# Patient Record
Sex: MALE | Race: BLACK OR AFRICAN AMERICAN | NOT HISPANIC OR LATINO | Employment: STUDENT | URBAN - METROPOLITAN AREA
[De-identification: names, ages, dates, MRNs, and addresses within clinical notes are randomized per-mention and may not be internally consistent; named-entity substitution may affect disease eponyms.]

---

## 2023-05-14 ENCOUNTER — HOSPITAL ENCOUNTER (EMERGENCY)
Facility: CLINIC | Age: 23
Discharge: HOME | End: 2023-05-14
Attending: EMERGENCY MEDICINE | Admitting: EMERGENCY MEDICINE
Payer: COMMERCIAL

## 2023-05-14 ENCOUNTER — APPOINTMENT (OUTPATIENT)
Dept: GENERAL RADIOLOGY | Facility: CLINIC | Age: 23
End: 2023-05-14
Attending: EMERGENCY MEDICINE
Payer: COMMERCIAL

## 2023-05-14 VITALS
TEMPERATURE: 98.3 F | RESPIRATION RATE: 18 BRPM | BODY MASS INDEX: 31.5 KG/M2 | SYSTOLIC BLOOD PRESSURE: 119 MMHG | WEIGHT: 220 LBS | HEART RATE: 85 BPM | HEIGHT: 70 IN | DIASTOLIC BLOOD PRESSURE: 80 MMHG

## 2023-05-14 DIAGNOSIS — K21.00 GASTROESOPHAGEAL REFLUX DISEASE WITH ESOPHAGITIS WITHOUT HEMORRHAGE: ICD-10-CM

## 2023-05-14 DIAGNOSIS — J20.9 ACUTE BRONCHITIS, UNSPECIFIED ORGANISM: ICD-10-CM

## 2023-05-14 DIAGNOSIS — Z11.52 ENCOUNTER FOR SCREENING LABORATORY TESTING FOR SEVERE ACUTE RESPIRATORY SYNDROME CORONAVIRUS 2 (SARS-COV-2): ICD-10-CM

## 2023-05-14 LAB
ALBUMIN SERPL BCG-MCNC: 4.5 G/DL (ref 3.5–5.2)
ALP SERPL-CCNC: 89 U/L (ref 40–129)
ALT SERPL W P-5'-P-CCNC: 80 U/L (ref 10–50)
ANION GAP SERPL CALCULATED.3IONS-SCNC: 13 MMOL/L (ref 7–15)
AST SERPL W P-5'-P-CCNC: 33 U/L (ref 10–50)
ATRIAL RATE - MUSE: 98 BPM
BASOPHILS # BLD AUTO: 0 10E3/UL (ref 0–0.2)
BASOPHILS NFR BLD AUTO: 1 %
BILIRUB SERPL-MCNC: 0.5 MG/DL
BUN SERPL-MCNC: 13 MG/DL (ref 6–20)
CALCIUM SERPL-MCNC: 9.6 MG/DL (ref 8.6–10)
CHLORIDE SERPL-SCNC: 106 MMOL/L (ref 98–107)
CREAT SERPL-MCNC: 0.92 MG/DL (ref 0.67–1.17)
DEPRECATED HCO3 PLAS-SCNC: 23 MMOL/L (ref 22–29)
DIASTOLIC BLOOD PRESSURE - MUSE: NORMAL MMHG
EOSINOPHIL # BLD AUTO: 0.1 10E3/UL (ref 0–0.7)
EOSINOPHIL NFR BLD AUTO: 2 %
ERYTHROCYTE [DISTWIDTH] IN BLOOD BY AUTOMATED COUNT: 12.4 % (ref 10–15)
FLUAV RNA SPEC QL NAA+PROBE: NEGATIVE
FLUBV RNA RESP QL NAA+PROBE: NEGATIVE
GFR SERPL CREATININE-BSD FRML MDRD: >90 ML/MIN/1.73M2
GLUCOSE SERPL-MCNC: 134 MG/DL (ref 70–99)
HCT VFR BLD AUTO: 43.3 % (ref 40–53)
HGB BLD-MCNC: 14.5 G/DL (ref 13.3–17.7)
HOLD SPECIMEN: NORMAL
HOLD SPECIMEN: NORMAL
IMM GRANULOCYTES # BLD: 0 10E3/UL
IMM GRANULOCYTES NFR BLD: 0 %
INTERPRETATION ECG - MUSE: NORMAL
LIPASE SERPL-CCNC: 17 U/L (ref 13–60)
LYMPHOCYTES # BLD AUTO: 1.8 10E3/UL (ref 0.8–5.3)
LYMPHOCYTES NFR BLD AUTO: 24 %
MCH RBC QN AUTO: 28.6 PG (ref 26.5–33)
MCHC RBC AUTO-ENTMCNC: 33.5 G/DL (ref 31.5–36.5)
MCV RBC AUTO: 85 FL (ref 78–100)
MONOCYTES # BLD AUTO: 0.6 10E3/UL (ref 0–1.3)
MONOCYTES NFR BLD AUTO: 8 %
NEUTROPHILS # BLD AUTO: 5 10E3/UL (ref 1.6–8.3)
NEUTROPHILS NFR BLD AUTO: 65 %
NRBC # BLD AUTO: 0 10E3/UL
NRBC BLD AUTO-RTO: 0 /100
P AXIS - MUSE: 46 DEGREES
PLATELET # BLD AUTO: 316 10E3/UL (ref 150–450)
POTASSIUM SERPL-SCNC: 3.7 MMOL/L (ref 3.4–5.3)
PR INTERVAL - MUSE: 124 MS
PROT SERPL-MCNC: 7.8 G/DL (ref 6.4–8.3)
QRS DURATION - MUSE: 78 MS
QT - MUSE: 332 MS
QTC - MUSE: 423 MS
R AXIS - MUSE: 22 DEGREES
RBC # BLD AUTO: 5.07 10E6/UL (ref 4.4–5.9)
RSV RNA SPEC NAA+PROBE: NEGATIVE
SARS-COV-2 RNA RESP QL NAA+PROBE: NEGATIVE
SODIUM SERPL-SCNC: 142 MMOL/L (ref 136–145)
SYSTOLIC BLOOD PRESSURE - MUSE: NORMAL MMHG
T AXIS - MUSE: 4 DEGREES
TROPONIN T SERPL HS-MCNC: 7 NG/L
VENTRICULAR RATE- MUSE: 98 BPM
WBC # BLD AUTO: 7.6 10E3/UL (ref 4–11)

## 2023-05-14 PROCEDURE — 250N000011 HC RX IP 250 OP 636: Performed by: EMERGENCY MEDICINE

## 2023-05-14 PROCEDURE — 84484 ASSAY OF TROPONIN QUANT: CPT | Performed by: EMERGENCY MEDICINE

## 2023-05-14 PROCEDURE — 36415 COLL VENOUS BLD VENIPUNCTURE: CPT | Performed by: EMERGENCY MEDICINE

## 2023-05-14 PROCEDURE — 87637 SARSCOV2&INF A&B&RSV AMP PRB: CPT | Performed by: EMERGENCY MEDICINE

## 2023-05-14 PROCEDURE — 85025 COMPLETE CBC W/AUTO DIFF WBC: CPT | Performed by: EMERGENCY MEDICINE

## 2023-05-14 PROCEDURE — 83690 ASSAY OF LIPASE: CPT | Performed by: EMERGENCY MEDICINE

## 2023-05-14 PROCEDURE — 93005 ELECTROCARDIOGRAM TRACING: CPT

## 2023-05-14 PROCEDURE — 99285 EMERGENCY DEPT VISIT HI MDM: CPT | Mod: 25

## 2023-05-14 PROCEDURE — 71046 X-RAY EXAM CHEST 2 VIEWS: CPT

## 2023-05-14 PROCEDURE — 93010 ELECTROCARDIOGRAM REPORT: CPT | Performed by: EMERGENCY MEDICINE

## 2023-05-14 PROCEDURE — 250N000013 HC RX MED GY IP 250 OP 250 PS 637: Performed by: EMERGENCY MEDICINE

## 2023-05-14 PROCEDURE — 80053 COMPREHEN METABOLIC PANEL: CPT | Performed by: EMERGENCY MEDICINE

## 2023-05-14 PROCEDURE — 99284 EMERGENCY DEPT VISIT MOD MDM: CPT | Mod: 25 | Performed by: EMERGENCY MEDICINE

## 2023-05-14 PROCEDURE — C9803 HOPD COVID-19 SPEC COLLECT: HCPCS

## 2023-05-14 PROCEDURE — 71046 X-RAY EXAM CHEST 2 VIEWS: CPT | Mod: 26 | Performed by: RADIOLOGY

## 2023-05-14 RX ORDER — ONDANSETRON 4 MG/1
4 TABLET, ORALLY DISINTEGRATING ORAL EVERY 8 HOURS PRN
Qty: 10 TABLET | Refills: 0 | Status: SHIPPED | OUTPATIENT
Start: 2023-05-14 | End: 2023-05-17

## 2023-05-14 RX ORDER — AZITHROMYCIN 250 MG/1
TABLET, FILM COATED ORAL
Qty: 6 TABLET | Refills: 0 | Status: SHIPPED | OUTPATIENT
Start: 2023-05-14 | End: 2023-05-19

## 2023-05-14 RX ORDER — ONDANSETRON 4 MG/1
4 TABLET, ORALLY DISINTEGRATING ORAL ONCE
Status: COMPLETED | OUTPATIENT
Start: 2023-05-14 | End: 2023-05-14

## 2023-05-14 RX ORDER — LANSOPRAZOLE 30 MG/1
30 CAPSULE, DELAYED RELEASE ORAL
Status: DISCONTINUED | OUTPATIENT
Start: 2023-05-14 | End: 2023-05-14 | Stop reason: HOSPADM

## 2023-05-14 RX ORDER — LANSOPRAZOLE 30 MG/1
30 CAPSULE, DELAYED RELEASE ORAL DAILY
Qty: 30 CAPSULE | Refills: 0 | Status: SHIPPED | OUTPATIENT
Start: 2023-05-14 | End: 2024-03-19

## 2023-05-14 RX ADMIN — ONDANSETRON 4 MG: 4 TABLET, ORALLY DISINTEGRATING ORAL at 16:42

## 2023-05-14 RX ADMIN — LANSOPRAZOLE 30 MG: 30 CAPSULE, DELAYED RELEASE PELLETS ORAL at 17:01

## 2023-05-14 ASSESSMENT — ACTIVITIES OF DAILY LIVING (ADL)
ADLS_ACUITY_SCORE: 35
ADLS_ACUITY_SCORE: 35

## 2023-05-14 NOTE — ED PROVIDER NOTES
"    [place] EMERGENCY DEPARTMENT ([facility])    5/14/23       ED PROVIDER NOTE     History     Chief Complaint   Patient presents with     Chest Pain     The history is provided by the patient and medical records.     Collin Ramos is a 23 year old male with history of obstructive sleep apnea who presents to the emergency department with chest pain and URI symptoms.  He reports for the past 3 to 4 days he has had cough with mucus production and a low-grade fever.  Last night he developed nausea and vomiting with some burning in his chest.  His fever is improved.  He denies abdominal pain.  He does not smoke.  He denies alcohol use.  He does report a history of gastric reflux.  He recently returned from [place].  He denies exposures.    Per [facility] records, patient has had 2 doses of the Pfizer COVID-19 vaccine.    Past Medical History  History reviewed. No pertinent past medical history.  History reviewed. No pertinent surgical history.  azithromycin (ZITHROMAX) 250 MG tablet  LANsoprazole (PREVACID) 30 MG DR capsule  ondansetron (ZOFRAN ODT) 4 MG ODT tab  ibuprofen (ADVIL,MOTRIN) 200 MG tablet      No Known Allergies  Family History  Family History   Problem Relation Age of Onset     Asthma Sister      Hypertension Maternal Grandfather      Hypertension Paternal Grandmother      Hypertension Paternal Grandfather      Cerebrovascular Disease Maternal Grandmother      Social History   Social History     Tobacco Use     Smoking status: Never     Smokeless tobacco: Never      Past medical history, past surgical history, medications, allergies, family history, and social history were reviewed with the patient. No additional pertinent items.      A medically appropriate review of systems was performed with pertinent positives and negatives noted in the HPI, and all other systems negative.    Physical Exam   BP: 131/73  Pulse: 112  Temp: 98.3  F (36.8  C)  Resp: 18  Height: 177.8 cm (5' 10\")  Weight: 99.8 kg (220 " lb)  Physical Exam  Vitals and nursing note reviewed.   Constitutional:       General: He is not in acute distress.     Appearance: He is well-developed. He is not diaphoretic.   HENT:      Head: Normocephalic and atraumatic.   Eyes:      General: No scleral icterus.     Pupils: Pupils are equal, round, and reactive to light.   Cardiovascular:      Rate and Rhythm: Normal rate and regular rhythm.      Heart sounds: Normal heart sounds. No murmur heard.  Pulmonary:      Effort: No respiratory distress.      Breath sounds: No stridor. No wheezing or rales.   Abdominal:      General: Bowel sounds are normal.      Palpations: Abdomen is soft.      Tenderness: There is no abdominal tenderness.   Musculoskeletal:         General: No tenderness.      Cervical back: Normal range of motion and neck supple.   Skin:     General: Skin is warm and dry.      Coloration: Skin is not pale.      Findings: No erythema or rash.   Neurological:      Mental Status: He is alert and oriented to person, place, and time.      Sensory: No sensory deficit.      Coordination: Coordination normal.           ED Course, Procedures, & Data      Procedures       An EKG was performed.  It was read by me at 1636 PM.  It shows a normal sinus rhythm with a rate of 98.  There do not appear to be acute ST-T segment abnormalities.  QT interval is 332.  There are no old comparison EKGs.        Results for orders placed or performed during the hospital encounter of 05/14/23   XR Chest 2 Views     Status: None    Narrative    EXAM: XR CHEST 2 VIEWS  5/14/2023 4:40 PM     HISTORY:  chest pain, cough       COMPARISON:  None available    FINDINGS:   PA and lateral views of the chest.    Trachea is midline. Cardiomediastinal silhouette and pulmonary  vasculature are within normal limits. No focal airspace opacity,  pleural effusion or appreciable pneumothorax.    No acute osseous abnormality. Visualized upper abdomen is  unremarkable.        Impression     IMPRESSION: No focal airspace consolidation.     I have personally reviewed the examination and initial interpretation  and I agree with the findings.    DEBBI BOATENG MD         SYSTEM ID:  [id number]   Comprehensive metabolic panel     Status: Abnormal   Result Value Ref Range    Sodium 142 136 - 145 mmol/L    Potassium 3.7 3.4 - 5.3 mmol/L    Chloride 106 98 - 107 mmol/L    Carbon Dioxide (CO2) 23 22 - 29 mmol/L    Anion Gap 13 7 - 15 mmol/L    Urea Nitrogen 13.0 6.0 - 20.0 mg/dL    Creatinine 0.92 0.67 - 1.17 mg/dL    Calcium 9.6 8.6 - 10.0 mg/dL    Glucose 134 (H) 70 - 99 mg/dL    Alkaline Phosphatase 89 40 - 129 U/L    AST 33 10 - 50 U/L    ALT 80 (H) 10 - 50 U/L    Protein Total 7.8 6.4 - 8.3 g/dL    Albumin 4.5 3.5 - 5.2 g/dL    Bilirubin Total 0.5 <=1.2 mg/dL    GFR Estimate >90 >60 mL/min/1.73m2   Lipase     Status: Normal   Result Value Ref Range    Lipase 17 13 - 60 U/L   Troponin T, High Sensitivity     Status: Normal   Result Value Ref Range    Troponin T, High Sensitivity 7 <=22 ng/L   CBC with platelets and differential     Status: None   Result Value Ref Range    WBC Count 7.6 4.0 - 11.0 10e3/uL    RBC Count 5.07 4.40 - 5.90 10e6/uL    Hemoglobin 14.5 13.3 - 17.7 g/dL    Hematocrit 43.3 40.0 - 53.0 %    MCV 85 78 - 100 fL    MCH 28.6 26.5 - 33.0 pg    MCHC 33.5 31.5 - 36.5 g/dL    RDW 12.4 10.0 - 15.0 %    Platelet Count 316 150 - 450 10e3/uL    % Neutrophils 65 %    % Lymphocytes 24 %    % Monocytes 8 %    % Eosinophils 2 %    % Basophils 1 %    % Immature Granulocytes 0 %    NRBCs per 100 WBC 0 <1 /100    Absolute Neutrophils 5.0 1.6 - 8.3 10e3/uL    Absolute Lymphocytes 1.8 0.8 - 5.3 10e3/uL    Absolute Monocytes 0.6 0.0 - 1.3 10e3/uL    Absolute Eosinophils 0.1 0.0 - 0.7 10e3/uL    Absolute Basophils 0.0 0.0 - 0.2 10e3/uL    Absolute Immature Granulocytes 0.0 <=0.4 10e3/uL    Absolute NRBCs 0.0 10e3/uL   Asymptomatic Influenza A/B, RSV, & SARS-CoV2 PCR (COVID-19) Nasopharyngeal     Status: Normal     Specimen: Nasopharyngeal; Swab   Result Value Ref Range    Influenza A PCR Negative Negative    Influenza B PCR Negative Negative    RSV PCR Negative Negative    SARS CoV2 PCR Negative Negative    Narrative    Testing was performed using the Xpert Xpress CoV2/Flu/RSV Assay on the Cepheid GeneXpert Instrument. This test should be ordered for the detection of SARS-CoV-2, influenza, and RSV viruses in individuals who meet clinical and/or epidemiological criteria. Test performance is unknown in asymptomatic patients. This test is for in vitro diagnostic use under the FDA EUA for laboratories certified under CLIA to perform high or moderate complexity testing. This test has not been FDA cleared or approved. A negative result does not rule out the presence of PCR inhibitors in the specimen or target RNA in concentration below the limit of detection for the assay. If only one viral target is positive but coinfection with multiple targets is suspected, the sample should be re-tested with another FDA cleared, approved, or authorized test, if coinfection would change clinical management. This test was validated by the [facility]. These laboratories are certified under the Clinical Laboratory Improvement Amendments of 1988 (CLIA-88) as qualified to perform high complexity laboratory testing.   EKG 12-lead, tracing only     Status: None (Preliminary result)   Result Value Ref Range    Systolic Blood Pressure  mmHg    Diastolic Blood Pressure  mmHg    Ventricular Rate 98 BPM    Atrial Rate 98 BPM    WV Interval 124 ms    QRS Duration 78 ms     ms    QTc 423 ms    P Axis 46 degrees    R AXIS 22 degrees    T Axis 4 degrees    Interpretation ECG       Sinus rhythm  Nonspecific T wave abnormality  Abnormal ECG       Medications   LANsoprazole (PREVACID) CR capsule 30 mg (30 mg Oral $Given 5/14/23 1701)   ondansetron (ZOFRAN ODT) ODT tab 4 mg (4 mg Oral $Given 5/14/23 1642)              Critical care was  not performed.     Medical Decision Making  The patient's presentation was of moderate complexity (an undiagnosed new problem with uncertain diagnosis).    The patient's evaluation involved:  ordering and/or review of 3+ test(s) in this encounter (see separate area of note for details)  independent interpretation of testing performed by another health professional (see separate area of note for details)    The patient's management necessitated moderate risk (prescription drug management including medications given in the ED).      Assessment & Plan    Patient is a 23-year-old male who presents to the emergency department with a 3-day history of productive cough and low-grade fever.  He also reports some reflux primarily at night which causes burning in his chest.  He denies shortness of breath.    On exam, patient's blood pressure is 131/73 with a temperature 98.3 and pulse rate of 112.  Respirations are 18 with O2 saturations 98%.    Labs will be obtained including COVID.  A chest x-ray will also be done.    Patient EKG showed no evidence of acute ischemia.    High-sensitivity troponin was 7, and this will not be repeated.    Patient CBC returned with a white count of 7.6 and hemoglobin of 14.5.    Comprehensive metabolic profile shows an ALT of 80 and sugar of 134.  I see no previous LFTs.  Lipase is 17.    Viral swab was obtained which was negative for influenza, RSV, and COVID.    Chest x-ray was obtained and independently reviewed by me.  I saw no evidence of volume overload, effusion, pneumonia, or pneumothorax.    Patient likely has an upper respiratory infection with probable viral etiology.  He also has symptoms characteristic of reflux disease.  I do not see that he has had an upper GI endoscopy in the past.  This could be helpful.  Patient will be treated with antibiotics for his bronchitis and he will be placed on a proton pump inhibitor to decrease acid production.  If he has ongoing symptoms, he was  instructed to follow-up with primary care with possible GI referral.  He may benefit from H. pylori testing as well.  Patient's vital signs were retested and he was no longer tachycardic.  He was given Zofran with improvement of his nausea.    I have reviewed the nursing notes. I have reviewed the findings, diagnosis, plan and need for follow up with the patient.    Discharge Medication List as of 5/14/2023  7:07 PM      START taking these medications    Details   azithromycin (ZITHROMAX) 250 MG tablet Take 2 tablets (500 mg) by mouth daily for 1 day, THEN 1 tablet (250 mg) daily for 4 days., Disp-6 tablet, R-0, Local Print      LANsoprazole (PREVACID) 30 MG DR capsule Take 1 capsule (30 mg) by mouth daily, Disp-30 capsule, R-0, Local Print      ondansetron (ZOFRAN ODT) 4 MG ODT tab Take 1 tablet (4 mg) by mouth every 8 hours as needed for nausea, Disp-10 tablet, R-0, Local Print             Final diagnoses:   Gastroesophageal reflux disease with esophagitis without hemorrhage   Acute bronchitis, unspecified organism       Jewel Solomon MD  [facility] EMERGENCY DEPARTMENT  5/14/2023     Jewel Solomon MD  05/16/23 0954

## 2023-05-14 NOTE — ED TRIAGE NOTES
Pt presents with a 5 day history of cold symptoms including runny nose, possible fever, sneezing and cough.  Pt reports that the last day or so he has been experiencing an acid sensation with vomiting at night and now reports epigastric pain 5/10 increasing to 10 at times.       Triage Assessment     Row Name 05/14/23 1104       Triage Assessment (Adult)    Airway WDL WDL       Respiratory WDL    Respiratory WDL WDL       Skin Circulation/Temperature WDL    Skin Circulation/Temperature WDL WDL       Cardiac WDL    Cardiac WDL WDL       Peripheral/Neurovascular WDL    Peripheral Neurovascular WDL WDL       Cognitive/Neuro/Behavioral WDL    Cognitive/Neuro/Behavioral WDL WDL

## 2023-11-21 ENCOUNTER — OFFICE VISIT (OUTPATIENT)
Dept: FAMILY MEDICINE | Facility: CLINIC | Age: 23
End: 2023-11-21
Payer: COMMERCIAL

## 2023-11-21 VITALS
DIASTOLIC BLOOD PRESSURE: 79 MMHG | HEART RATE: 89 BPM | SYSTOLIC BLOOD PRESSURE: 130 MMHG | HEIGHT: 70 IN | OXYGEN SATURATION: 95 % | RESPIRATION RATE: 16 BRPM | WEIGHT: 228 LBS | BODY MASS INDEX: 32.64 KG/M2 | TEMPERATURE: 97.8 F

## 2023-11-21 DIAGNOSIS — G47.8 SLEEP PARALYSIS: ICD-10-CM

## 2023-11-21 DIAGNOSIS — E66.811 CLASS 1 OBESITY WITHOUT SERIOUS COMORBIDITY WITH BODY MASS INDEX (BMI) OF 32.0 TO 32.9 IN ADULT, UNSPECIFIED OBESITY TYPE: ICD-10-CM

## 2023-11-21 DIAGNOSIS — K21.00 GASTROESOPHAGEAL REFLUX DISEASE WITH ESOPHAGITIS, UNSPECIFIED WHETHER HEMORRHAGE: ICD-10-CM

## 2023-11-21 DIAGNOSIS — R06.83 SNORING: ICD-10-CM

## 2023-11-21 DIAGNOSIS — Z00.00 HEALTHCARE MAINTENANCE: Primary | ICD-10-CM

## 2023-11-21 LAB — GLUCOSE BLD-MCNC: 94 MG/DL (ref 60–99)

## 2023-11-21 PROCEDURE — 99385 PREV VISIT NEW AGE 18-39: CPT | Mod: 25 | Performed by: FAMILY MEDICINE

## 2023-11-21 PROCEDURE — 90472 IMMUNIZATION ADMIN EACH ADD: CPT | Performed by: FAMILY MEDICINE

## 2023-11-21 PROCEDURE — 99203 OFFICE O/P NEW LOW 30 MIN: CPT | Mod: 25 | Performed by: FAMILY MEDICINE

## 2023-11-21 PROCEDURE — 80061 LIPID PANEL: CPT | Performed by: FAMILY MEDICINE

## 2023-11-21 PROCEDURE — 36415 COLL VENOUS BLD VENIPUNCTURE: CPT | Performed by: FAMILY MEDICINE

## 2023-11-21 PROCEDURE — 90715 TDAP VACCINE 7 YRS/> IM: CPT | Performed by: FAMILY MEDICINE

## 2023-11-21 PROCEDURE — 84443 ASSAY THYROID STIM HORMONE: CPT | Performed by: FAMILY MEDICINE

## 2023-11-21 PROCEDURE — 90471 IMMUNIZATION ADMIN: CPT | Performed by: FAMILY MEDICINE

## 2023-11-21 PROCEDURE — 90651 9VHPV VACCINE 2/3 DOSE IM: CPT | Performed by: FAMILY MEDICINE

## 2023-11-21 PROCEDURE — 82947 ASSAY GLUCOSE BLOOD QUANT: CPT | Performed by: FAMILY MEDICINE

## 2023-11-21 ASSESSMENT — ENCOUNTER SYMPTOMS
ARTHRALGIAS: 0
JOINT SWELLING: 0
CHILLS: 0
HEARTBURN: 1
PALPITATIONS: 0
FREQUENCY: 0
SORE THROAT: 0
COUGH: 0
HEADACHES: 0
DIARRHEA: 0
FEVER: 0
DYSURIA: 0
CONSTIPATION: 0
DIZZINESS: 0
NAUSEA: 0
EYE PAIN: 0
MYALGIAS: 0
NERVOUS/ANXIOUS: 0
HEMATOCHEZIA: 0
ABDOMINAL PAIN: 0
HEMATURIA: 0
WEAKNESS: 0
SHORTNESS OF BREATH: 0
PARESTHESIAS: 0

## 2023-11-21 NOTE — PROGRESS NOTES
Assessment/ Plan  Class 1 obesity without serious comorbidity with body mass index (BMI) of 32.0 to 32.9 in adult, unspecified obesity type  Encouraged healthy eating, eating fruits and vegetables first.  Patient has gone through times, particularly in the winter, in the summer starts to eat better and exercise regularly, then falls off during the summer months and gained a lot of weight.  Discouraged this, encourage regular exercise.  Check TSH, lipid and glucose    Sleep paralysis  [facility], reviewed care everywhere.  Patient should follow-up with sleep study as recommended last year, since he has had initial evaluation through [facility], recommend he contact them for referral.  If this does not work, I would refer within our system.      Gastroesophageal reflux disease with esophagitis, unspecified whether hemorrhage  Noncyclic pretty severe nighttime symptoms, discussed elevating head of bed, he has used PPIs in the past but they do not stop the reflux symptoms.  Discussed measures to eat small frequent meals, not eat late in the day, etc.  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 (Patient not taking: Reported on 11/21/2023)  LANsoprazole (PREVACID) 30 MG DR capsule, Take 1 capsule (30 mg) by mouth daily (Patient not taking: Reported on 11/21/2023)    No current facility-administered medications on file prior to visit.       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  Gen- alert, oriented/ appropriately responsive  HEENT- normal cephalic, atraumatic.   Oral cavity grossly normal  Chest- Normal inspiration and expiration.    Clear to ascultation.    No chest wall deformity or scar.  CV- Heart regular rate and rhythm  normal tones, no murmurs   Abdomen-soft, and nontender, no organomegaly or masses.  No gallops or rubs.  Ext- appear well perfused, no edema  Skin- warm and dry, no concerning lesions/rash noted  Neuro exam grossly nonfocal-cranial nerves intact, normal gait, movements.  no visualized rash    Diagnostics:   ***      Please note: Voice recognition software was used in this dictation.  It may therefore contain typographical errors.      "

## 2023-11-21 NOTE — PROGRESS NOTES
Adult Male Physical  A/P  Class 1 obesity without serious comorbidity with body mass index (BMI) of 32.0 to 32.9 in adult, unspecified obesity type  Encouraged healthy eating, eating fruits and vegetables first.  Patient has gone through times, particularly in the winter, in the summer starts to eat better and exercise regularly, then falls off during the summer months and gained a lot of weight.  Discouraged this, encourage regular exercise.  Check TSH, lipid and glucose    Sleep paralysis  [facility], reviewed care everywhere.  Patient should follow-up with sleep study as recommended last year, since he has had initial evaluation through [facility], recommend he contact them for referral.  If this does not work, I would refer within our system.      Gastroesophageal reflux disease with esophagitis, unspecified whether hemorrhage  Noncyclic pretty severe nighttime symptoms, discussed elevating head of bed, he has used PPIs in the past but they do not stop the reflux symptoms.  Discussed measures to eat small frequent meals, not eat late in the day, etc.  If ineffective, offered referral to gastroenterology         HPI  Current Concerns/ Questions  23-year-old, new patient to our clinic here for physical exam.  Into questions today.  First, has long history of snoring, sensation of choking at night.  Also sleep paralysis.  Saw sleep medicine through [facility] about 1 year ago.  Sleep study was recommended, for some reason this was never completed.  He wishes to proceed at this point.  I asked him to speak with his sleep doctor and gave him the name of the person he saw.  Second is reflux.  Particularly at night.  Quite severe.  Was treated in the past with PPI, this stopped the burning but did not stop the sensation of reflux.  States that if he eats anything afternoon, he will have trouble at night.    He is overweight, working on diet as described above and  exercise.  Typically loses weight in the winter.  PFSH:  Current medications reviewed as follows:  ibuprofen (ADVIL,MOTRIN) 200 MG tablet, Take 200 mg by mouth every 8 hours as needed. (Patient not taking: Reported on 11/21/2023)  LANsoprazole (PREVACID) 30 MG DR capsule, Take 1 capsule (30 mg) by mouth daily (Patient not taking: Reported on 11/21/2023)    No current facility-administered medications on file prior to visit.     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  No past surgical history on file.  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    Home care    Single , straight         Immunization History   Administered Date(s) Administered    COVID-19 MONOVALENT 12+ (Pfizer) 06/01/2021, 06/22/2021    DTAP (<7y) 2000, 2000, 2000, 07/09/2001    DTP-Hib 07/19/2001    HEPA 09/05/2005, 03/02/2006    HEPATITIS A (PEDS 12M-18Y) 05/14/2015    HIB (PRP-T) 2000, 2000, 2000, 07/19/2001    HPV9 11/13/2018, 11/21/2023    HepB 2000, 2000, 02/22/2001    Hepatitis B, Adult 2000, 2000, 02/22/2001    Hib, Unspecified 07/19/2001    Historical DTP/aP 2000, 2000, 2000, 07/19/2001    Influenza Vaccine >6 months,quad, PF 11/13/2018    MMR  "07/19/2001, 09/06/2005    Meningococcal (Menomune ) 10/26/2011    Poliovirus, inactivated (IPV) 2000, 2000, 02/22/2001, 09/05/2005, 05/14/2015    TDAP (Adacel,Boostrix) 11/21/2023    TDAP Vaccine (Adacel) 10/26/2011    TRIHIBIT (DTAP/HIB, <7y) 07/19/2001    Typhoid IM 05/14/2015    Varicella 03/12/2001    Varicella Immunity: Titer/MD Dx 09/15/2002    Yellow Fever 05/14/2015     Body mass index is 32.46 kg/m .        Objective  Physical Exam  Vitals:    11/21/23 1316   BP: 130/79   BP Location: Right arm   Patient Position: Sitting   Cuff Size: Adult Large   Pulse: 89   Resp: 16   Temp: 97.8  F (36.6  C)   TempSrc: Temporal   SpO2: 95%   Weight: 103.4 kg (228 lb)   Height: 1.785 m (5' 10.28\")     Overweight  Gen- alert and oriented x3, no acute distress  HEENT- Normocephalic atraumatic   pupils equal and reactive, EOMI.    TMs visualized and normal, ear canals normal.    Mouth moist with normal mucosa no ulceration, dentition normal or in good repair.  Neck- supple, no adenopathy or thyromegaly  Chest- Normal chest wall apperance, normal inspiration and expiration.  Clear to asculation.  CV- Regular rate and rhythm, normal tones, no murmus, gallops or rubs.  Abd-  Soft, nodistended, nontender.  Normal bowel sounds, no mass or organ enlargement.  Genitalia-  was not done  RENEA: was not done  Ext- Atraumatic,  No synovial thickening. Good perfusion, no edema. Periph pulses detected  Skin- warm and dry, no rash  Neuro- Cranial nerves grossly intact.  Normal gait, normal strength.  Reflexes symmetric.  Coordination intact.    Diagnostics  Results for orders placed or performed in visit on 11/21/23   Glucose, whole blood     Status: Normal   Result Value Ref Range    Glucose Whole Blood 94 60 - 99 mg/dL                     Answers submitted by the patient for this visit:  Annual Preventive Visit (Submitted on 11/21/2023)  Chief Complaint: Annual Exam:  Frequency of exercise:: None  Getting at least 3 servings " of Calcium per day:: Yes  Diet:: Regular (no restrictions)  Taking medications regularly:: Yes  Medication side effects:: None  Bi-annual eye exam:: NO  Dental care twice a year:: NO  Sleep apnea or symptoms of sleep apnea:: Daytime drowsiness, Excessive snoring, Sleep apnea  abdominal pain: No  Blood in stool: No  Blood in urine: No  chest pain: No  chills: No  congestion: No  constipation: No  cough: No  diarrhea: No  dizziness: No  ear pain: No  eye pain: No  nervous/anxious: No  fever: No  frequency: No  genital sores: No  headaches: No  hearing loss: No  heartburn: Yes  arthralgias: No  joint swelling: No  peripheral edema: No  mood changes: No  myalgias: No  nausea: No  dysuria: No  palpitations: No  Skin sensation changes: No  sore throat: No  urgency: No  rash: No  shortness of breath: No  visual disturbance: No  weakness: No  impotence: No  penile discharge: No  Additional concerns today:: Yes

## 2023-11-21 NOTE — ASSESSMENT & PLAN NOTE
Encouraged healthy eating, eating fruits and vegetables first.  Patient has gone through times, particularly in the winter, in the summer starts to eat better and exercise regularly, then falls off during the summer months and gained a lot of weight.  Discouraged this, encourage regular exercise.  Check TSH, lipid and glucose

## 2023-11-21 NOTE — ASSESSMENT & PLAN NOTE
Noncyclic pretty severe nighttime symptoms, discussed elevating head of bed, he has used PPIs in the past but they do not stop the reflux symptoms.  Discussed measures to eat small frequent meals, not eat late in the day, etc.  If ineffective, offered referral to gastroenterology

## 2023-11-21 NOTE — LETTER
November 22, 2023      Collin Ramos  [address]  [place] [address]        Dear ,    We are writing to inform you of your test results.    Collin, triglycerides are elevated and HDL cholesterol is low.  I would work on getting in better shape, eating fewer carbohydrates/sugar, more vegetables, regular exercise.  We can recheck in 1 year.  No sign of diabetes and your thyroid test is normal.      Resulted Orders   TSH with free T4 reflex   Result Value Ref Range    TSH 1.47 0.30 - 4.20 uIU/mL   Glucose, whole blood   Result Value Ref Range    Glucose Whole Blood 94 60 - 99 mg/dL   Lipid Profile (Chol, Trig, HDL, LDL calc)   Result Value Ref Range    Cholesterol 174 <200 mg/dL    Triglycerides 199 (H) <150 mg/dL    Direct Measure HDL 32 (L) >=40 mg/dL    LDL Cholesterol Calculated 102 (H) <=100 mg/dL    Non HDL Cholesterol 142 (H) <130 mg/dL    Narrative    Cholesterol  Desirable:  <200 mg/dL    Triglycerides  Normal:  Less than 150 mg/dL  Borderline High:  150-199 mg/dL  High:  200-499 mg/dL  Very High:  Greater than or equal to 500 mg/dL    Direct Measure HDL  Female:  Greater than or equal to 50 mg/dL   Male:  Greater than or equal to 40 mg/dL    LDL Cholesterol  Desirable:  <100mg/dL  Above Desirable:  100-129 mg/dL   Borderline High:  130-159 mg/dL   High:  160-189 mg/dL   Very High:  >= 190 mg/dL    Non HDL Cholesterol  Desirable:  130 mg/dL  Above Desirable:  130-159 mg/dL  Borderline High:  160-189 mg/dL  High:  190-219 mg/dL  Very High:  Greater than or equal to 220 mg/dL       If you have any questions or concerns, please call the clinic at the number listed above.       Sincerely,      Juventino Calvin MD

## 2023-11-21 NOTE — ASSESSMENT & PLAN NOTE
Noted [facility], reviewed care everywhere.  Patient should follow-up with sleep study as recommended last year, since he has had initial evaluation through [facility], recommend he contact them for referral.  If this does not work, I would refer within our system.

## 2023-11-22 LAB
CHOLEST SERPL-MCNC: 174 MG/DL
HDLC SERPL-MCNC: 32 MG/DL
LDLC SERPL CALC-MCNC: 102 MG/DL
NONHDLC SERPL-MCNC: 142 MG/DL
TRIGL SERPL-MCNC: 199 MG/DL
TSH SERPL DL<=0.005 MIU/L-ACNC: 1.47 UIU/ML (ref 0.3–4.2)

## 2024-03-19 DIAGNOSIS — K21.00 GASTROESOPHAGEAL REFLUX DISEASE WITH ESOPHAGITIS, UNSPECIFIED WHETHER HEMORRHAGE: Primary | ICD-10-CM

## 2024-03-19 RX ORDER — LANSOPRAZOLE 30 MG/1
30 CAPSULE, DELAYED RELEASE ORAL DAILY
Qty: 30 CAPSULE | Refills: 0 | Status: SHIPPED | OUTPATIENT
Start: 2024-03-19 | End: 2024-04-15

## 2024-03-19 NOTE — TELEPHONE ENCOUNTER
Medication Question or Refill        What medication are you calling about (include dose and sig)?: LANsoprazole (PREVACID) 30 MG DR capsule     Preferred Pharmacy:    [id number] - [place], [place] - [address] AT [facility] [address]  [address]  [place] [address]  Phone: [phone] Fax: [phone]      Controlled Substance Agreement on file:   CSA -- Patient Level:    CSA: None found at the patient level.       Who prescribed the medication?: PCP    Do you need a refill? Yes    When did you use the medication last? 03/18/2024    Patient offered an appointment? No    Do you have any questions or concerns?  Yes: Patient is out of medication.      [facility] or would you prefer to receive a phone call?:   Patient would prefer a phone call   Okay to leave a detailed message?: Yes at Cell number on file:    Telephone Information:   Mobile [phone]

## 2024-03-28 ENCOUNTER — TELEPHONE (OUTPATIENT)
Dept: FAMILY MEDICINE | Facility: CLINIC | Age: 24
End: 2024-03-28

## 2024-03-28 NOTE — TELEPHONE ENCOUNTER
Pt has appt today on MA schedule and NO SHOW.  Called pt and lvm, if pt calls back please help schedule.

## 2024-03-28 NOTE — LETTER
April 1, 2024      Collin Ramos  [address]  [address]  [place] [address]        Dear Collin,           As a valued [facility] patient, your healthcare needs are our priority.    Your health care team has determined that you are due for an appointment  vaccine .   We encourage you to call or schedule an appointment with your primary care provider to discuss your overdue screening and schedule an appointment.     If you already have had your screening performed at another health care facility, please ask that practice to send your results to [facility] [phone] and we will update your health records. This will ensure you receive the best possible care from our providers.      If you have any questions or need help with scheduling, please call the [facility] at [phone].        Yours in health,       Your care team at [facility]

## 2024-04-15 DIAGNOSIS — K21.00 GASTROESOPHAGEAL REFLUX DISEASE WITH ESOPHAGITIS, UNSPECIFIED WHETHER HEMORRHAGE: ICD-10-CM

## 2024-04-15 RX ORDER — LANSOPRAZOLE 30 MG/1
30 CAPSULE, DELAYED RELEASE ORAL DAILY
Qty: 30 CAPSULE | Refills: 5 | Status: SHIPPED | OUTPATIENT
Start: 2024-04-15 | End: 2024-04-24

## 2024-04-16 ENCOUNTER — ALLIED HEALTH/NURSE VISIT (OUTPATIENT)
Dept: FAMILY MEDICINE | Facility: CLINIC | Age: 24
End: 2024-04-16
Payer: COMMERCIAL

## 2024-04-16 DIAGNOSIS — Z23 ENCOUNTER FOR IMMUNIZATION: Primary | ICD-10-CM

## 2024-04-16 PROCEDURE — 99207 PR NO CHARGE NURSE ONLY: CPT

## 2024-04-16 PROCEDURE — 90651 9VHPV VACCINE 2/3 DOSE IM: CPT

## 2024-04-16 PROCEDURE — 90471 IMMUNIZATION ADMIN: CPT

## 2024-04-16 NOTE — PROGRESS NOTES
Prior to immunization administration, verified patients identity using patient s name and date of birth. Please see Immunization Activity for additional information.     Screening Questionnaire for Adult Immunization    Are you sick today?   No   Do you have allergies to medications, food, a vaccine component or latex?   No   Have you ever had a serious reaction after receiving a vaccination?   No   Do you have a long-term health problem with heart, lung, kidney, or metabolic disease (e.g., diabetes), asthma, a blood disorder, no spleen, complement component deficiency, a cochlear implant, or a spinal fluid leak?  Are you on long-term aspirin therapy?   No   Do you have cancer, leukemia, HIV/AIDS, or any other immune system problem?   No   Do you have a parent, brother, or sister with an immune system problem?   No   In the past 3 months, have you taken medications that affect  your immune system, such as prednisone, other steroids, or anticancer drugs; drugs for the treatment of rheumatoid arthritis, Crohn s disease, or psoriasis; or have you had radiation treatments?   No   Have you had a seizure, or a brain or other nervous system problem?   No   During the past year, have you received a transfusion of blood or blood    products, or been given immune (gamma) globulin or antiviral drug?   No   For women: Are you pregnant or is there a chance you could become       pregnant during the next month?   No   Have you received any vaccinations in the past 4 weeks?   No     Immunization questionnaire answers were all negative.    I have reviewed the following standing orders:   This patient is due and qualifies for the HPV vaccine.    Click here for HPV (Adult 15-45Y) Standing Order     I have reviewed the vaccines inclusion and exclusion criteria;No concerns regarding eligibility.       Patient instructed to remain in clinic for 15 minutes afterwards, and to report any adverse reactions.     Screening performed by Mayela  JENNIFER Vega on 4/16/2024 at 1:50 PM.

## 2024-04-24 ENCOUNTER — VIRTUAL VISIT (OUTPATIENT)
Dept: FAMILY MEDICINE | Facility: CLINIC | Age: 24
End: 2024-04-24
Payer: COMMERCIAL

## 2024-04-24 DIAGNOSIS — F90.9 ATTENTION DEFICIT HYPERACTIVITY DISORDER (ADHD), UNSPECIFIED ADHD TYPE: Primary | ICD-10-CM

## 2024-04-24 PROCEDURE — 99213 OFFICE O/P EST LOW 20 MIN: CPT | Mod: 95 | Performed by: FAMILY MEDICINE

## 2024-04-24 RX ORDER — DEXTROAMPHETAMINE SACCHARATE, AMPHETAMINE ASPARTATE MONOHYDRATE, DEXTROAMPHETAMINE SULFATE AND AMPHETAMINE SULFATE 5; 5; 5; 5 MG/1; MG/1; MG/1; MG/1
20 CAPSULE, EXTENDED RELEASE ORAL DAILY
Qty: 30 CAPSULE | Refills: 0 | Status: SHIPPED | OUTPATIENT
Start: 2024-04-24 | End: 2024-05-24

## 2024-04-24 RX ORDER — DEXTROAMPHETAMINE SACCHARATE, AMPHETAMINE ASPARTATE MONOHYDRATE, DEXTROAMPHETAMINE SULFATE AND AMPHETAMINE SULFATE 5; 5; 5; 5 MG/1; MG/1; MG/1; MG/1
20 CAPSULE, EXTENDED RELEASE ORAL DAILY
Qty: 30 CAPSULE | Refills: 0 | Status: SHIPPED | OUTPATIENT
Start: 2024-06-25 | End: 2024-07-25

## 2024-04-24 RX ORDER — DEXTROAMPHETAMINE SACCHARATE, AMPHETAMINE ASPARTATE MONOHYDRATE, DEXTROAMPHETAMINE SULFATE AND AMPHETAMINE SULFATE 5; 5; 5; 5 MG/1; MG/1; MG/1; MG/1
20 CAPSULE, EXTENDED RELEASE ORAL DAILY
Qty: 30 CAPSULE | Refills: 0 | Status: SHIPPED | OUTPATIENT
Start: 2024-05-25 | End: 2024-06-24

## 2024-04-24 NOTE — ASSESSMENT & PLAN NOTE
Probable ADD.  Struggling, failing in school.  Does well with test but has difficulty with daily homework.  Disorganized no current moodissues, no drug use.  Problems began in earnest in preadolescence.  Family history of ADHD and sister.  Unremarkable pregnancy, early health history.  Plan  Empiric Adderall XR 20 daily  Referral for evaluation  Follow-up in person.  Discussed potential side effects of stimulant medication including palpitations/heart arrhythmias.

## 2024-04-24 NOTE — PROGRESS NOTES
Collin is a 24 year old who is being evaluated via a billable video visit.    How would you like to obtain your AVS? MyChart  If the video visit is dropped, the invitation should be resent by: Text to cell phone: [phone]  Will anyone else be joining your video visit? No          Subjective   Collin is a 24 year old, presenting for the following health issues:  Mental Health Problem      4/24/2024     5:14 PM   Additional Questions   Roomed by Opal PRINCE     Video Start Time:  536    Attention deficit hyperactivity disorder (ADHD), unspecified ADHD type  Probable ADD.  Struggling, failing in school.  Does well with test but has difficulty with daily homework.  Disorganized no current moodissues, no drug use.  Problems began in earnest in preadolescence.  Family history of ADHD and sister.  Unremarkable pregnancy, early health history.  Plan  Empiric Adderall XR 20 daily  Referral for evaluation  Follow-up in person.  Discussed potential side effects of stimulant medication including palpitations/heart arrhythmias.       24-year-old, was going to school at [employer], was not accurate dermic participation and out basically needs to reapply to get back into school.  Lost his .  Cannot focus or give attention to things.  Mainly homework and daily work, does well in task, seems to be able to get up for these.  Gets 95 send test but then fails the rest of class.  Difficulty with studying for long periods of time.  Indicates that he is somewhat impulsive, not a good listener, disorganized, mom says he loses everything.  History as above    No drugs, does not think he is depressed.    On exam patient is alert, oriented, no distress.  Video-Visit Details    Type of service:  Video Visit   Video End Time:550  Originating Location (pt. Location): Home    Distant Location (provider location):  On-site  Platform used for Video Visit: Gideon  Signed Electronically by: Juventino Calvin MD    Answers submitted by the patient for  this visit:  General Questionnaire (Submitted on 4/24/2024)  Chief Complaint: Chronic problems general questions HPI Form  How many servings of fruits and vegetables do you eat daily?: 0-1  On average, how many sweetened beverages do you drink each day (Examples: soda, juice, sweet tea, etc.  Do NOT count diet or artificially sweetened beverages)?: 1  How many minutes a day do you exercise enough to make your heart beat faster?: 9 or less  How many days a week do you exercise enough to make your heart beat faster?: 3 or less  How many days per week do you miss taking your medication?: 0  General Concern (Submitted on 4/24/2024)  Chief Complaint: Chronic problems general questions HPI Form  What is the reason for your visit today?: Attention/focus problems  When did your symptoms begin?: More than a month  How would you describe these symptoms?: Severe  Are your symptoms:: Worsening  Have you had these symptoms before?: Yes  Have you tried or received treatment for these symptoms before?: No

## 2024-06-26 ENCOUNTER — VIRTUAL VISIT (OUTPATIENT)
Dept: PSYCHOLOGY | Facility: CLINIC | Age: 24
End: 2024-06-26
Attending: FAMILY MEDICINE
Payer: COMMERCIAL

## 2024-06-26 DIAGNOSIS — F90.9 ATTENTION DEFICIT HYPERACTIVITY DISORDER (ADHD), UNSPECIFIED ADHD TYPE: ICD-10-CM

## 2024-06-26 DIAGNOSIS — F43.22 ADJUSTMENT DISORDER WITH ANXIETY: Primary | ICD-10-CM

## 2024-06-26 PROCEDURE — 90832 PSYTX W PT 30 MINUTES: CPT | Mod: 95 | Performed by: PSYCHOLOGIST

## 2024-06-26 ASSESSMENT — ANXIETY QUESTIONNAIRES
1. FEELING NERVOUS, ANXIOUS, OR ON EDGE: SEVERAL DAYS
7. FEELING AFRAID AS IF SOMETHING AWFUL MIGHT HAPPEN: NOT AT ALL
5. BEING SO RESTLESS THAT IT IS HARD TO SIT STILL: NOT AT ALL
3. WORRYING TOO MUCH ABOUT DIFFERENT THINGS: SEVERAL DAYS
6. BECOMING EASILY ANNOYED OR IRRITABLE: NOT AT ALL
2. NOT BEING ABLE TO STOP OR CONTROL WORRYING: SEVERAL DAYS
GAD7 TOTAL SCORE: 3
GAD7 TOTAL SCORE: 3

## 2024-06-26 ASSESSMENT — PATIENT HEALTH QUESTIONNAIRE - PHQ9
SUM OF ALL RESPONSES TO PHQ QUESTIONS 1-9: 5
10. IF YOU CHECKED OFF ANY PROBLEMS, HOW DIFFICULT HAVE THESE PROBLEMS MADE IT FOR YOU TO DO YOUR WORK, TAKE CARE OF THINGS AT HOME, OR GET ALONG WITH OTHER PEOPLE: SOMEWHAT DIFFICULT
5. POOR APPETITE OR OVEREATING: NOT AT ALL
SUM OF ALL RESPONSES TO PHQ QUESTIONS 1-9: 5

## 2024-06-26 ASSESSMENT — COLUMBIA-SUICIDE SEVERITY RATING SCALE - C-SSRS
TOTAL  NUMBER OF ABORTED OR SELF INTERRUPTED ATTEMPTS LIFETIME: NO
1. HAVE YOU WISHED YOU WERE DEAD OR WISHED YOU COULD GO TO SLEEP AND NOT WAKE UP?: NO
TOTAL  NUMBER OF INTERRUPTED ATTEMPTS LIFETIME: NO
2. HAVE YOU ACTUALLY HAD ANY THOUGHTS OF KILLING YOURSELF?: NO
ATTEMPT LIFETIME: NO
6. HAVE YOU EVER DONE ANYTHING, STARTED TO DO ANYTHING, OR PREPARED TO DO ANYTHING TO END YOUR LIFE?: NO

## 2024-06-26 NOTE — PROGRESS NOTES
[facility]   Mental Health & Addiction Services     Progress Note - Initial Visit    Client Name:  Collin Ramos Date: 2024         Service Type: Individual     Visit Start Time: 9:00am  Visit End Time: 9:25am    Visit #: 1    Attendees: Client attended alone    Service Modality:  Video Visit:      Provider verified identity through the following two step process.  Patient provided:  Patient  and Patient address    Telemedicine Visit: The patient's condition can be safely assessed and treated via synchronous audio and visual telemedicine encounter.      Reason for Telemedicine Visit: Services only offered telehealth    Originating Site (Patient Location): Patient's home    Distant Site (Provider Location): Provider Remote Setting- Home Office    Consent:  The patient/guardian has verbally consented to: the potential risks and benefits of telemedicine (video visit) versus in person care; bill my insurance or make self-payment for services provided; and responsibility for payment of non-covered services.     Patient would like the video invitation sent by:  Send to e-mail at: [email or web address]    Mode of Communication:  [facility]    Distant Location (Provider):  Off-site    As the provider I attest to compliance with applicable laws and regulations related to telemedicine.       DATA:  Extended Session (53+ minutes): No  Interactive Complexity: No   Crisis: No     Presenting Concerns/Current Stressors:   Patient presented to session to initiate the ADHD evaluation process.           2024     8:32 AM   PHQ   PHQ-9 Total Score 5   Q9: Thoughts of better off dead/self-harm past 2 weeks Not at all            2024     8:32 AM   RAI-7 SCORE   Total Score 3       ASSESSMENT:  Mental Status Assessment:  Appearance:   Appropriate   Eye Contact:   Good   Psychomotor Behavior: Normal   Attitude:   Cooperative   Orientation:   All  Speech   Rate /  Production: Normal/ Responsive   Volume:  Normal   Mood:    Normal  Affect:    Appropriate   Thought Content:  Clear   Thought Form:  Logical   Insight:    Good       Safety Issues and Plan for Safety and Risk Management:   [place] Suicide Severity Rating Scale (Lifetime/Recent)      2024     9:05 AM   [place] Suicide Severity Rating (Lifetime/Recent)   Q1 Wish to be Dead (Lifetime) N   Q2 Non-Specific Active Suicidal Thoughts (Lifetime) N   Actual Attempt (Lifetime) N   Has subject engaged in non-suicidal self-injurious behavior? (Lifetime) N   Interrupted Attempts (Lifetime) N   Aborted or Self-Interrupted Attempt (Lifetime) N   Preparatory Acts or Behavior (Lifetime) N   Calculated C-SSRS Risk Score (Lifetime/Recent) No Risk Indicated     Patient denies current fears or concerns for personal safety.  Patient denies current or recent suicidal ideation or behaviors.  Patient denies current or recent homicidal ideation or behaviors.  Patient denies current or recent self injurious behavior or ideation.  Patient denies other safety concerns.  Recommended that patient call 911 or go to the local ED should there be a change in any of these risk factors.   Patient reports there are no firearms in the house.    Diagnostic Criteria:  F43.22:  A.  The development of emotional or behavioral symptoms in response to an identifiable stressor(s) occurring within 3 months of the onset of the stressor(s).  B.  The symptoms or behaviors are clinically significant, as evidenced by one or both of the followin.  Marked distress that is out of proportion to the severity or intensity of the stressor, taking into account the external context and the cultural factors that might influence symptom severity and presentation.   2.  Significant impairment in social, occupational, or other important areas of functioning.  C.  The stress-related disturbance does not meet the criteria for another mental disorder and is not merely an  exacerbation of a pre-existing mental disorder.  D.  The symptoms do not represent normal bereavement.  E.  Once the stressor or its consequences have terminated, the symptoms do not persist for more than an additional 6 months.      DSM5 Diagnoses: (Sustained by DSM5 Criteria Listed Above)  Diagnoses:   1. Adjustment disorder with anxiety    2. Attention deficit hyperactivity disorder (ADHD), unspecified ADHD type      Psychosocial & Contextual Factors: social support, employed, planning to go back to school in fall    PROMIS-10 Scores  Global Mental Health Score: (P) 16  Global Physical Health Score: (P) 15   PROMIS TOTAL - SUBSCORES: (P) 31      Intervention:              Reviewed symptoms and history of presenting concern. Patient endorsed symptoms consistent with anxiety  and ADHD. Patient has Adderall prescription and stated that this has significantly improves symptoms of inattention and anxiety. Patient denied symptoms associated with depression , tanya, panic, OCD, trauma, and perceptual difficulties. Unable to complete diagnostic intake, will be completed in next session.    CBT: socratic questioning, positive reinforcement  EFT: empathetic attunement, emotion checking, emotion naming  MI: open ended questions, affirmations, reflections        Attendance Agreement:  Client has not signed the attendance agreement. Discussed expectations at beginning of this first session and patient agreed.       PLAN:  Provider will continue Diagnostic Assessment in next session. Patient will complete Yumiko questionnaires  and CNS Vital Signs prior to next session (7/3/2024). Patient agreed to take CNS Vital Signs OFF Adderall.     Patient meets the following risk assessment and triage: Patient denied any current/recent/lifetime history of suicidal ideation and/or behaviors.  No safety plan indicated at this time.     Medical necessity criteria is warranted in order to: Measure a psychological disorder and its severity  and functional impairment to determine psychiatric diagnosis when a mental illness is suspected, or to achieve a differential diagnosis from a range of medical/psychological disorders that present with similar constellations of symptoms (e.g., determination and measurement of anxiety severity and impact in the presence of ongoing asthma or heart disease), Perform symptom measurement to objectively measure treatment effectiveness and/or determine the need to refer for pharmacological treatment or other medical evaluation (e.g., based on severity and chronicity of symptoms), and Evaluate primary symptoms of impaired attention and concentration that can occur in many neurological and psychiatric conditions.    Medical necessity for psychological assessment is warranted as a result of the following: (1) A specific clinical question is posed that relates to the condition/symptoms being addressed (2) The question cannot be adequately addressed by clinical interview and/or behavioral observation (3) Results of psychological testing are reasonably expected to provide an answer to the query (4) It is reasonably expected that the testing will provide information leading to a clearer diagnosis and/or guide treatment planning with an expectation of improved clinical outcome.    I acknowledge that, based upon current clinical information, the patient and I have reviewed and discussed issues pertaining to the purpose of therapy/testing, potential therapeutic goals, procedures, risks and benefits, and estimated duration of therapy/testing. Issues pertaining to fees/insurance and confidentiality were also addressed with the patient, who indicated understanding and elected to continue with appointments. I will not be providing any experimental procedures and, if we agree that a change in clinical procedure would be more beneficial, I will obtain specific consent for that procedure or refer you to another provider who has expertise  in that area.       Roselia Ribeiro PsyD, LP  Clinical Psychologist

## 2024-07-03 ENCOUNTER — VIRTUAL VISIT (OUTPATIENT)
Dept: PSYCHOLOGY | Facility: CLINIC | Age: 24
End: 2024-07-03
Payer: COMMERCIAL

## 2024-07-03 DIAGNOSIS — F43.22 ADJUSTMENT DISORDER WITH ANXIETY: Primary | ICD-10-CM

## 2024-07-03 DIAGNOSIS — F90.9 ATTENTION DEFICIT HYPERACTIVITY DISORDER (ADHD), UNSPECIFIED ADHD TYPE: ICD-10-CM

## 2024-07-03 PROCEDURE — 90791 PSYCH DIAGNOSTIC EVALUATION: CPT | Mod: 95 | Performed by: PSYCHOLOGIST

## 2024-07-03 NOTE — PROGRESS NOTES
[facility]  Provider Name:  Rosleia MARTHA Ribeiro     Credentials:  MAAME Pickett    PATIENT'S NAME: Collin Ramos  PREFERRED NAME: Collin  PRONOUNS: he/him  MRN: [id number]  : 2000  ADDRESS: [address] [place] [address]  ACCT. NUMBER:  [id number]  DATE OF SERVICE: 24  START TIME: 8:00am  END TIME: 8:30am  PREFERRED PHONE: [phone]  SERVICE MODALITY:  Video Visit:      Provider verified identity through the following two step process.  Patient provided:  Patient  and Patient address    Telemedicine Visit: The patient's condition can be safely assessed and treated via synchronous audio and visual telemedicine encounter.      Reason for Telemedicine Visit: Services only offered telehealth    Originating Site (Patient Location): Patient's home    Distant Site (Provider Location): Provider Remote Setting- Home Office    Consent:  The patient/guardian has verbally consented to: the potential risks and benefits of telemedicine (video visit) versus in person care; bill my insurance or make self-payment for services provided; and responsibility for payment of non-covered services.     Patient would like the video invitation sent by:  Send to e-mail at: [email or web address]    Mode of Communication:  Video Conference via Amwell    Distant Location (Provider):  Off-site    As the provider I attest to compliance with applicable laws and regulations related to telemedicine.    UNIVERSAL ADULT Mental Health DIAGNOSTIC ASSESSMENT    Identifying Information:  Patient is a 24 year old, [place] man. The pronoun use throughout this assessment reflects the patient's chosen pronoun. Patient was referred for an assessment by Juventino Calvin MD. Patient attended the session alone.     Chief Complaint:   Patient reported seeking services at this time for diagnostic assessment and recommendations for treatment. Patient's presenting concerns include: Difficulty use with focus and task  "completion.  The patient reported that he attempted to resolve symptoms through meditation and other behavioral coping strategies, but these were ineffective. Specifically, the patient reported experiencing the following symptoms: difficulty sustaining attention, problems listening when spoken to directly, not following through with tasks, difficulty organizing, avoiding tasks that require sustained mental effort, losing things often, and being forgetful.     Patient reported that he has not been assessed for ADHD in the past. Symptoms reportedly began in childhood. The patient reported that he has never been diagnosed with another mental health disorder in the past.  He did report some symptoms consistent with anxiety, as he tends to worry about time, tasks to complete, and the future.  The symptoms began in middle school client reported that other professional(s) are involved in providing services, as he was referred by his PCP.    Social/Family History:  Patient reported he grew up in  [place], [place] and [place], [place] . Patient was the second born of 2 children. There are no known complications during pregnancy or delivery.  The patient reported that his parents  when he was young.  He grew up with his mother and sister in the home while his father primarily resided in [facility] and [place].  When the patient was 5 years old, he moved from [place] to [place].  Moved back to [place] when he was about [age] years old.  Stated that these transitions were smooth because he had family in both locations.  Patient reported no difficulty with childhood peer relationships.  Did not participate in extracurricular activities.  Stated that childhood was \"fine.\"  Continues to maintain positive relationships with family members.    The patient denied a history of learning disorders, special education programming, and receiving tutoring services. Patient denied a history of head injuries.  He reported having " difficulties remembering his functioning in early years, indicating that he likely did not have struggles paying attention in class.  However, indicated that homework was procrastinated and he had difficulties focusing on reading.  Organization was also a problem, as he was messy and had papers all over.  By high school, the patient reported that tedious homework was difficult to keep up with. Recalled academic strengths in reading and math.  After high school, began attending college and has completed an associates degree thus far.  He reported difficulties with missing deadlines, procrastination, and paying attention effectively in lectures.  Most of his courses were in person.  He indicated that he did try online courses in an effort to try working on coursework during times that things felt easier for him, but this was not effective.  Has plans to return to college this August to complete a bachelors and masters degree.    The patient describes his cultural background as [place], Yarsanism, American.  Cultural influences and impact on patient's life structure, values, norms, and healthcare: immigration history, Jewish identification. Patient identified his preferred language to be English. Patient reported he does not need the assistance of an  or other support involved in therapy.     Patient is currently single. Patient identified their sexual orientation as heterosexual. Patient reported having zero child(malcom). Patient identified mother, siblings, and friends as part of his support system. Patient identified the quality of these relationships as stable and meaningful.      Patient is staying in own home/apartment. Patient lives with his mother and sister. Housing is stable.     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  He reported that his mother and sister often manage the paperwork while he is responsible for providing transportation.   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 Patient reports finances are obtained through employment.     Patient has not served in the .     Patient reported that he has not been involved with the legal system. Patient denies being on probation / parole / under the jurisdiction of the court.    Patient has received a 's license. Patient denied problems with inattention/hyperactivity while driving.    Patient's Strengths and Limitations:  Patient identified the following strengths or resources that will help them succeed in treatment: dex / spirituality, friends / good social support, family support, insight, intelligence, positive school connection, positive work environment, motivation, strong social skills, and work ethic. Things that may interfere with the patient's success in treatment include: none identified.     Personal and Family Medical History:  Patient reported no family history of mental health issues.  Patient previously received the following mental health diagnosis: ADHD (by PCP).   Patient has received the following mental health services in the past: medication(s) from physician / PCP.   Patient is currently receiving the following services: medication(s) from physician / PCP.  Hospitalizations: None.   Previous/Current commitments: None.     Patient has had a physical exam to rule out medical causes for current symptoms. Date of last physical exam was 4/24/2024. The patient's PCP is Juventino Calvin MD. Patient reported the following current medical concerns: Sleep apnea managed with a CPAP . Patient denies any issues with pain.. There are not significant appetite/nutritional concerns/weight changes.    Current Outpatient Medications   Medication Sig Dispense Refill    amphetamine-dextroamphetamine (ADDERALL XR) 20 MG 24 hr capsule Take 1 capsule (20 mg) by mouth daily for 30 days 30 capsule 0     No current facility-administered medications  for this visit.       Client reports taking prescribed medications as prescribed.    Patient Allergies:   Allergies   Allergen Reactions    Iodinated Contrast Media Unknown     PN: LW CM1: CONTRAST- NKA Reaction :    Amoxicillin Rash       Medical History: No past medical history on file.    Family history includes: family history includes Asthma in his sister; Cerebrovascular Disease in his maternal grandmother; Hypertension in his father, maternal grandfather, mother, paternal grandfather, and paternal grandmother.    Current Mental Status Exam:   Appearance:  Appropriate    Eye Contact:  Good   Psychomotor:  Normal       Gait / station:  no problem  Attitude / Demeanor: Cooperative   Speech      Rate / Production: Normal/ Responsive      Volume:  Normal  volume      Language:  intact  Mood:   Normal  Affect:   Appropriate    Thought Content: Clear   Thought Process: Logical       Associations: No loosening of associations  Insight:   Good   Judgment:  Intact   Orientation:  All  Attention/concentration: Good    Rating Scales:  PHQ9:        6/26/2024     8:32 AM   PHQ-9 SCORE   PHQ-9 Total Score MyChart 5 (Mild depression)   PHQ-9 Total Score 5       GAD7:        6/26/2024     8:32 AM   RAI-7 SCORE   Total Score 3       Substance Use:  Patient did not report a family history of substance use concerns; see medical history section for details. Patient has not received chemical dependency treatment in the past. Patient has not ever been to detox. Patient is not currently receiving any chemical dependency treatment. Patient reported  no  problems as a result of his substance use.    Alcohol: None.  Nicotine: None.  Cannabis: None.  Caffeine: Occasional soda or tea.  Street Drugs: None.  Prescription Drugs: None.    CAGE: None of the patient's responses to the CAGE screening were positive / Negative CAGE score     Substance Use: No symptoms    Based on the negative CAGE score and clinical interview there are not  indications of drug or alcohol abuse.    Significant Losses/Trauma/Abuse/Neglect Issues:   There are indications or report of significant loss, trauma, abuse or neglect issues related to: are no indications and client denies any losses, trauma, abuse, or neglect concerns.  Concerns for possible neglect are not present.    Safety Assessment:   Jackson Suicide Severity Rating Scale (Lifetime/Recent)Jackson Suicide Severity Rating Scale (Lifetime/Recent)      6/26/2024     9:05 AM   Jackson Suicide Severity Rating (Lifetime/Recent)   Q1 Wish to be Dead (Lifetime) N   Q2 Non-Specific Active Suicidal Thoughts (Lifetime) N   Actual Attempt (Lifetime) N   Has subject engaged in non-suicidal self-injurious behavior? (Lifetime) N   Interrupted Attempts (Lifetime) N   Aborted or Self-Interrupted Attempt (Lifetime) N   Preparatory Acts or Behavior (Lifetime) N   Calculated C-SSRS Risk Score (Lifetime/Recent) No Risk Indicated     Patient denies current homicidal ideation and behaviors.  Patient denies current self-injurious ideation and behaviors.    Patient denied risk behaviors associated with substance use.  Patient denies any high risk behaviors associated with mental health symptoms.  Patient reports the following current concerns for their personal safety: None.  Patient reports there are not firearms in the house.     History of Safety Concerns:  Patient denied a history of homicidal ideation.     Patient denied a history of personal safety concerns.    Patient denied a history of assaultive behaviors.    Patient denied a history of sexual assault behaviors.     Patient denied a history of risk behaviors associated with substance use.  Patient denies any history of high risk behaviors associated with mental health symptoms.  Patient reports the following protective factors: forward or future oriented thinking; dedication to family or friends; safe and stable environment; regular sleep; purpose; living with other  people; daily obligations; effective problem solving skills; commitment to well being; sense of meaning; positive social skills; healthy fear of risky behaviors or pain; sense of personal control or determination    Risk Plan:  See Recommendations for Safety and Risk Management Plan below.    Review of Patient-Reported Symptoms:  Depression: Change in energy level, Difficulties concentrating, Change in appetite, and Low self-worth  Phuong:  No Symptoms  Psychosis: No Symptoms  Anxiety: Excessive worry, Nervousness, and Poor concentration  Panic:  No symptoms  Post Traumatic Stress Disorder:  No Symptoms   Eating Disorder: No Symptoms  ADD / ADHD:  Inattentive, Difficulties listening, Poor task completion, Poor organizational skills, and Forgetful  Conduct Disorder: No symptoms  Autism Spectrum Disorder: No observed symptoms. An autism spectrum disorder diagnosis requires specialized assessment.  Obsessive Compulsive Disorder: No Symptoms  Patient reports the following compulsive behaviors and treatment history:  No symptoms .      Diagnostic Criteria:   F43.22:  A.  The development of emotional or behavioral symptoms in response to an identifiable stressor(s) occurring within 3 months of the onset of the stressor(s).  B.  The symptoms or behaviors are clinically significant, as evidenced by one or both of the followin.  Marked distress that is out of proportion to the severity or intensity of the stressor, taking into account the external context and the cultural factors that might influence symptom severity and presentation.   2.  Significant impairment in social, occupational, or other important areas of functioning.  C.  The stress-related disturbance does not meet the criteria for another mental disorder and is not merely an exacerbation of a pre-existing mental disorder.  D.  The symptoms do not represent normal bereavement.  E.  Once the stressor or its consequences have terminated, the symptoms do not  persist for more than an additional 6 months.    F90.9:  A. A persistent pattern of inattention and/or hyperactivity-impulsivity that interferes with functioning or development, as characterized by (1) and/or (2):   1. Six or more inattention symptoms that have persisted for at least 6 months to a degree that is inconsistent with developmental level and that negatively impacts directly on social and academic/occupational activities.   2. Six or more hyperactivity and impulsivity symptoms that have persisted for at least 6 months to a degree that is inconsistent with developmental level and that negatively impacts directly on social and academic/occupational activities.  B. Several symptoms (inattentive or hyperactive/impulsive) were present before the age of 12 years.  C. Several symptoms (inattentive or hyperactive/impulsive) present in ?2 settings (eg, at home, school, or work; with friends or relatives; in other activities).  D. There is clear evidence that the symptoms interfere with or reduce the quality of social, academic, or occupational functioning.  E. Symptoms do not occur exclusively during the course of schizophrenia or another psychotic disorder, and are not better explained by another mental disorder (eg, mood disorder, anxiety disorder, dissociative disorder, personality disorder, substance intoxication, or withdrawal).    Functional Status:  Patient reports the following functional impairments: academic performance, educational activities, management of the household and or completion of tasks, and work / vocational responsibilities.       PROMIS-10:   Global Mental Health Score: (P) 16  Global Physical Health Score: (P) 15   PROMIS TOTAL - SUBSCORES: (P) 31   Nonprogrammatic care: Patient is requesting basic services to address current mental health concerns.    Clinical Summary:  1. Reason for assessment: assessing reported deficits in executive functioning (rule in/out ADHD).  2. 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  3. Principal DSM-5 diagnoses (Sustained by DSM5 Criteria Listed Above) and other diagnoses relevant to this service:   1. Adjustment disorder with anxiety    2. Attention deficit hyperactivity disorder (ADHD), unspecified ADHD type      4. Prognosis: Expect Improvement.  5. Likely consequences of symptoms if not treated: Continued difficulties with inattention.  6. Client strengths include: insightful, intelligent, motivated, support of family, friends and providers, supportive, and work history .     Recommendations:   Per medical necessity criteria for psychological testing, patient will complete Yumiko questionnaires and CNS Vital Signs (on and off Adderall) before feedback session is scheduled. The patient was also made aware that the link expires after 7 days and if the test is not completed within that timeframe, it will be his responsibility to reinitiate contact to resume the testing process.  My contact information was provided.  Patient was in agreement to this plan.    1. Plan for Safety and Risk Management:  Safety and Risk: Recommended that patient call 911 or go to the local ED should there be a change in any of these risk factors..         Report to child / adult protection services was NA.     2. Patient's identified mental health concerns with a cultural influence will be addressed in final recommendations.     3. Initial Treatment will focus on: ADHD testing. See above.      4. Resources/Service Plan:    services are not indicated.   Modifications to assist communication are not indicated.   Additional disability accommodations are not indicated.      5. Collaboration:   Collaboration / coordination of treatment will be initiated with the following  support professionals: primary care physician.      6.  Referrals:   The following referral(s) will be initiated: N/A.    A Release of Information has  been obtained for the following: N/A.   Emergency Contact was not obtained.    Clinical Substantiation/medical necessity for the above recommendations:     Medical necessity criteria is warranted in order to: Measure a psychological disorder and its severity and functional impairment to determine psychiatric diagnosis when a mental illness is suspected, or to achieve a differential diagnosis from a range of medical/psychological disorders that present with similar constellations of symptoms (e.g., determination and measurement of anxiety severity and impact in the presence of ongoing asthma or heart disease), Perform symptom measurement to objectively measure treatment effectiveness and/or determine the need to refer for pharmacological treatment or other medical evaluation (e.g., based on severity and chronicity of symptoms), and Evaluate primary symptoms of impaired attention and concentration that can occur in many neurological and psychiatric conditions.    Medical necessity for psychological assessment is warranted as a result of the following: (1) A specific clinical question is posed that relates to the condition/symptoms being addressed (2) The question cannot be adequately addressed by clinical interview and/or behavioral observation (3) Results of psychological testing are reasonably expected to provide an answer to the query (4) It is reasonably expected that the testing will provide information leading to a clearer diagnosis and/or guide treatment planning with an expectation of improved clinical outcome.    7. RAYSHANW: N/A.    8. Records:   These were reviewed at time of assessment.   Information in this assessment was obtained from the medical record and  provided by patient who is a good historian. Patient will have open access to their mental health medical record.    9. Interactive Complexity: No     Parts of this documentation may have been completed using dictation software. Potential errors may  result and are unintentional.       Roselia Ribeiro PsyD, LP  July 3, 2024

## 2024-10-22 ENCOUNTER — PATIENT OUTREACH (OUTPATIENT)
Dept: CARE COORDINATION | Facility: CLINIC | Age: 24
End: 2024-10-22
Payer: COMMERCIAL

## 2024-11-05 ENCOUNTER — PATIENT OUTREACH (OUTPATIENT)
Dept: CARE COORDINATION | Facility: CLINIC | Age: 24
End: 2024-11-05
Payer: COMMERCIAL

## 2024-11-06 ENCOUNTER — VIRTUAL VISIT (OUTPATIENT)
Dept: PSYCHOLOGY | Facility: CLINIC | Age: 24
End: 2024-11-06
Payer: COMMERCIAL

## 2024-11-06 DIAGNOSIS — F90.0 ATTENTION DEFICIT HYPERACTIVITY DISORDER, INATTENTIVE TYPE, MODERATE: Primary | ICD-10-CM

## 2024-11-06 PROCEDURE — 96131 PSYCL TST EVAL PHYS/QHP EA: CPT | Mod: 95 | Performed by: PSYCHOLOGIST

## 2024-11-06 PROCEDURE — 96130 PSYCL TST EVAL PHYS/QHP 1ST: CPT | Mod: 95 | Performed by: PSYCHOLOGIST

## 2024-11-06 NOTE — PATIENT INSTRUCTIONS
Coping with Attention-Deficit/Hyperactivity Disorder (ADHD)    If you have ADHD, it can be hard to sit still, pay attention or control impulsive behavior. ADHD can cause problems at home, at school, at work and in social settings. But you can learn ways to control your symptoms. Below are some ideas for coping with the most common ADHD problems.     Memory, Focus, and Managing Time  Be mindful of time. Use a watch, phone, timer, alarm, PDA or computer--anything that keeps accurate time that you can see and hear at all times. Set more than one alarm to remind you how much time you have left for a task. Give yourself more time than you think you need. For important appointments or deadlines, set reminder alarms hours or days ahead of time.   Use a day planner. A day planner can help you manage time and remember responsibilities. Learning to use a planner is just like learning to use any tool--practice makes perfect.   Use lists. Lists and notes can help you keep track of regular tasks, projects, deadlines and appointments. If you decide to use a daily planner, keep all lists and notes inside of it. Use color codes for tasks so you know which ones are the most important.  Learn to say no and set boundaries. Do not take on too many projects or social engagements. Overbooking yourself can be overwhelming and can lead to missed commitments.  Repeat out loud the instructions you have been given. This will help you remember, as well as let others correct you if needed.    Organization  Create space. Ask yourself what you need on a daily basis. Then, find storage bins or closets for things you don t need every day. Have specific areas for things like keys, bills and other items that are easy to misplace. Throw away or donate things you don t need.   Deal with it now. You can avoid forgetfulness, clutter and procrastination by doing things right away, not some time in the future. This includes filing papers, cleaning up  messes, opening and responding to mail and returning phone calls.  Set up a filing system. Use dividers or separate file folders for different types of documents, such as medical records, receipts and pay stubs. Label and color-code your files so that you can quickly find what you need.   Break larger tasks into small, manageable pieces. Write down the steps needed to complete the task. Follow each step in order until the task is done. If needed, take small, timed breaks and return to finish the task.  Organize your work space. Use lists or planners to organize your day. Remove clutter from your desk. Label any storage bins. Reduce distraction as much as possible. Ideas include sitting facing the wall, closing your door or using a white noise machine.    Sitting Still  Move around as needed. Don t fight the urge to move around. If you need to fidget or stand up for a period of time to help maintain attention, then do so. But take care that you re not interrupting others. You may also find it helpful to squeeze a stress ball.  Be active in a useful way. Exercise can burn off extra energy, relieve stress, boost your mood and calm your mind. Meditation, yoga or lin chi can help you better control your attention and impulses.  Stay healthy. Get enough sleep. Avoid caffeine late in the day. Exercise. Create a relaxing bedtime routine. Stick to a schedule or daily routine. Eat small meals throughout the day. Avoid sugar, eat fewer carbohydrates and eat more protein.     Close Relationships  Talk to your loved ones about ADHD. There are many resources available that can help your loved one understand ADHD. See the Resources section below.  Divide daily tasks based on each person s strengths. For example, if you have trouble with organization, you may not want to do financial planning tasks.  If you have trouble with focus, develop a sign that others in your life can use as a gentle reminder to pay attention. The sign  should be small but meaningful to you and the other person.  Improve communication. Use a dry erase board in a common area to help the whole family stay in touch better. Keep regular to-do lists and compare scheduled activities every day. Writing notes to each other is also very helpful.  Be an active listener and try not to interrupt. If you find your mind wandering when others are talking, mentally repeat their words so you can follow the conversation. Ask questions and ask people to repeat what they said if needed. Pay close attention to body language.   Organize your thoughts. If you need to have a serious conversation, write a list of the points you would like to make or the important ideas you want to talk about. This can help you stay focused and remember what you need to say.  Think before speaking. It can be hard to control your impulses when you feel strongly about something. Before saying whatever pops into your head, stop to ask yourself  Will this be helpful?  or  Will this help me get what I want?   Take charge of your life. Work to accept that some things are harder for you. Make a plan to address these troubles and seek the support you need.    Online Resources  [employer]. http://www.addQustodianhouse.com  Attention Deficit Disorder Association. http://www.add.org  Children and Adults with Attention-Deficit/Hyperactivity Disorder (NOMI). http://www.nomi.org/  Mahsa Brunner.  33 Ways to Get Organized with Adult ADHD.  http://www.additudemag.com/adhd/article/729.html  National Resource Center on ADHD. http://www.quzf0udri.org/  Rzo Naidu.  Daily Living Tips for Adult ADHD.  http://www.medicinenet.com/living_tips_adult_adhd_pictures_slideshow/article.htm  Rey Allen and Lacy Garcia.  Help for Adult ADD / ADHD.  http://www.helpguide.org  You can also find many apps for your phone that can help you with common ADHD struggles    Book Resources  Aníbal Calderon. ADHD in Adults.  (2008).  Aníbal Calderon. Taking Charge of Adult ADHD. (2010).  Jhoan Camara and Geraldo Hall. Delivered from Distraction. (2006).  Jhoan Camara and Geraldo Hall. Driven to Distraction. (2011).  Paula Coronado. ADD in the Workplace. (1997).  Paula Coronado. ADD-Friendly Ways to Organize Your Life. (2002).  Liza Mg. The ADHD Effect on Marriage: Understand and Rebuild Your Relationship in Six Steps. (2010).  Radha Olivera and Nicole Hawkins. You Mean I m Not Lazy, Stupid or Crazy? (2006).  Avila Morales. Understand Your Brain, Get More Done: The ADHD Executive Functions Workbook. (2012).    Support Groups  ADHD Adult Support Group  [address].  7:00 to 8:30 p.m., last Thursday of each month (except December).  Contact/RSVP: [email or web address]    ADHD Adult Support Group  [address].  Thursday 10:00 a.m. to 12:00 p.m. or 7:00 to 9:30 p.m.  Contact/RSVP: Kyree Nam. [phone] or [email or web address]     ADHD Adult Support Group for Spouses/Partners of adults with ADHD  [address].  Tuesday 12:00 to 2:00 p.m.   Contact/RSVP: Kyree Nam. [phone] or [email or web address]

## 2024-11-06 NOTE — PROGRESS NOTES
"    Psychological Assessment Report    Patient: Collin Ramos  YOB: 2000  MRN: [id number]  Date(s) of assessment: 6/26/2024 and 7/3/2024  Referral Source: [facility]  Reason for Referral: assessing reported deficits in executive functioning     IDENTIFYING INFORMATION AND BRIEF HISTORY OF PRESENTING PROBLEM: Collin Ramos is a 24-year-old [place] man who presented to the initial diagnostic intake appointments on 6/26/2024 and 7/3/2024 (see diagnostic intake dated 7/3/2024 for more detailed background information) due to primary concerns with inattention.  The patient reported that he has tried other coping skills and strategies, such as meditation, but nothing has been effective.  In conversations with his sibling, he decided to pursue an evaluation.  While waiting for this evaluation to occur, his PCP did prescribe Adderall.     As a child, the patient reported problems with procrastination.  He stated that it was difficult to focus on reading, so he would put off this task.  Indicated problems with disorganization, as he was reportedly messy and would shove papers \"wherever.\"  He went on to describe that his attention would often fall off with tedious work.  Otherwise, denied problems being disruptive in class.  Also did not participate in extracurricular activities.  After high school, the patient began college.  He reported problems with missing deadlines and experiencing a \"block\" in being able to work on a task.  Went on to describe having problems zoning out during lectures but would be able to pay attention during more interesting courses.  He did complete his associates degree.  In August, went back to complete additional courses.  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  He reported problems with procrastination, especially as he is responsible for paperwork and transporting " clients.  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  At the time of the feedback session, the patient reported being employed with [employer].  Currently, the patient reported experiencing the following symptoms:  Difficulty sustaining attention (difficulty sustaining attention for longer than 10 minutes)  Does not listen when spoken to directly (zoning out during conversations)  Does not follow through with tasks (unfinished hobbies)  Difficulty organizing (physical spaces messy and school materials are disorganized)  Avoids/dislikes tasks that require sustained mental effort (procrastinates often)  Loses things often (has lost multiple phones in the past, others have reportedly told him that this is a problem)  Is forgetful (forgets where he has parked his car, needs to write things down often)  Feels the need to get up and move around often (stated that this has not necessarily been extreme, but has noticed that his prescribed Adderall has helped)    Mental Health History: The patient reported a history of anxiety symptoms that began in middle school.  He reported tending to worry about time, the future, and tasks that he needs to complete.  He stated that anxiety symptoms were worse prior to beginning his Adderall prescription.  The patient denied a history of depressive symptoms, manic symptoms, social anxiety, phobic responses, symptoms of obsessive-compulsive disorder, trauma, and perceptual difficulties. The patient denied issues with sexual compulsivity, gambling, and disordered eating.    Developmental History: The patient reported that he believes that he is the product of a full-term pregnancy and there were no complications during his mother's pregnancy or birth. The patient reported that he believes he met all of his developmental milestones on time.  He denied a history of head injuries,  "learning disorders, and special educational programming. The patient recalled academic strengths in reading and math until middle school years.  The patient grew up with his mother and older sister in the home.  Father primarily lived in [place] in [place].  The family moved from [place] to [place], [place] when he was about 5 years old.  Moved back to [place] when he was 11/12 years old.  Indicated that these transitions were \"smooth\" because he has many family members in both locations.  The patient continues residing with his mother and sister.    Chemical Dependence/Substance Abuse History: The patient denied a history of chemical or substance abuse.     SOURCES OF DATA/ASSESSMENT: Review of medical and psychiatric records, consideration of behavioral observations during the testing (if applicable), and the results of the psychological tests are all considered in the preparation of this psychological test report. It is important to note that test results comprise a hypothesis of the patient's mental health concerns and are not an independent or conclusive assessment. Test results are combined with the patient's available medical, psychological, behavioral data for an integrated interpretation and report. Due to virtual/remote administration, certain aspects of the assessment process were impacted, such as access to direct patient observation, and maintaining an environment conducive to testing. As such, external factors have the potential to affect the validity of data collected.    TESTS ADMINISTERED:  CNS Vital Signs Neurocognitive Battery  Yumiko Adult ADHD Rating Scale-IV: Self and Other Reports (BAARS-IV)  Yumiko Functional Impairment Scale: Self and Other Reports (BFIS)  Yumiko Deficits in Executive Functioning Scale (BDEFS)  Generalized Anxiety Disorder Questionnaire (RAI-7)  Patient Health Questionnaire- 9 (PHQ-9)    BEHAVIORAL OBSERVATIONS: The patient was pleasant and cooperative throughout all " interview and explanation of testing process. The patient was oriented to person, place, and time. Mood was neutral. Eye contact was adequate and speech was at normal rate and rhythm. Motor activity was appropriate. Due to virtual/remote administration, direct patient observation was not possible during the testing process, and it is unknown if the patient was able to maintain an environment conducive to testing. As such, external factors have the potential to affect the validity of data collected.     TEST RESULTS: Test results comprise a hypothesis of the patient's mental health concerns and are not an independent or conclusive assessment. Test results are combined with the patient's available medical, psychological, behavioral, and observational data for an integrated interpretation and report.    CNS Vital Signs Neurocognitive Battery  The CNS Vital Signs Neurocognitive Battery is a remotely-administered assessment comprised of seven core subtests to individually measure the patient's verbal memory, visual memory, motor speed, psychomotor speed, reaction time, focus, ability to sustain attention and ability to adapt to changing rules and tasks.      Above average domain scores indicate a standard score (SS) greater than 109 or a Percentile Rank (ME) greater than 74, indicating a high functioning test subject. Average is a SS  or ME 25-74, indicating normal function. Low Average is a SS 80-89 or ME 9-24 indicating a slight deficit or impairment. Below Average is a SS 70-79 or ME 2-8, indicating a moderate level of deficit or impairment. Very Low is a SS less than 70 or a ME less than 2, indicating a deficit and impairment.  Validity Indicator denotes a guideline for representing the possibility of an invalid test or domain score, and can be influenced by patient understanding, effort, or other conditions.    The patient's results are detailed below:    OFF PRESCRIBED ADDERALL  Domain Standard Score  Percentile Description Validity   Neurocognitive Index 71 3 Low Yes   Composite Memory Measure 84 14 Low Average Yes   Verbal Memory 97 42 Average Yes   Visual Memory 79 8 Low Yes   Psychomotor Speed 90 25 Average Yes   Reaction Time 35 1 Very Low Yes   Complex Attention 86 18 Low Average Yes   Cognitive Flexibility 62 1 Very Low Yes   Processing Speed 97 42 Average Yes   Executive Function 61 1 Very Low Yes   Reasoning 102 55 Average Yes   Working Memory 105 63 Average Yes   Sustained Attention  103 58 Average Yes   Simple Attention 96 40 Average Yes   Motor Speed 90 25 Average Yes     Neurocognitive Index (NCI): Measures an average score derived from the domain scores or a general assessment of the overall neurocognitive status of the patient. The patient's NCI score is 71, with a percentile of 3, and falls within the low range.    Composite Memory: Measures how well subject can recognize, remember, and retrieve words and geometric figures, and is comprised of the Visual and Verbal Memory domains. The patient's Composite Memory score is 84, with a percentile of 14, and falls within the low average range.    Verbal Memory: Measures how well subject can recognize, remember, and retrieve words. The patient's Verbal Memory score is 97, with a percentile of 42, and falls within the average range.    Visual Memory: Measures how well subject can recognize, remember and retrieve geometric figures. The patient's Visual Memory score is 79, with a percentile of 8, and falls within the low range.    Psychomotor Speed: Measures how well a subject perceives, attends, responds to complex visual-perceptual information and performs simple fine motor coordination, and is comprised of the Motor Speed and Processing Speed indexes. The patient's Psychomotor Speed score is 90, with a percentile of 25, and falls within the average range.    Reaction Time: Measures how quickly the subject can react, in milliseconds, to a simple and  increasingly complex direction set. The patient's Reaction Time score is 35, with a percentile of 1, and falls within the very low range.    Complex Attention: Measures the ability to track and respond to a variety of stimuli over lengthy periods of time and/or perform complex mental tasks requiring vigilance quickly and accurately. The patient's Complex Attention score is 86, with a percentile of 18, and falls within the low average range.    Cognitive Flexibility: Measures how well subject is able to adapt to rapidly changing and increasingly complex set of directions and/or to manipulate the information. The patient's Cognitive Flexibility score is 62, with a percentile of 1, and falls within the very low range.    Processing Speed: Measures how well a subject recognizes and processes information i.e., perceiving, attending/responding to incoming information, motor speed, fine motor coordination, and visual-perceptual ability. The patient's Processing Speed score is 97, with a percentile of 42, and falls within the average range.    Executive Function: Measures how well a subject recognizes rules, categories, and manages or navigates rapid decision making. The patient's Executive Function score is 61, with a percentile of 1, and falls within the very low range.    Reasoning: Measures how well a subject can perceive and understand the meaning of visual or abstract information and recognizing relationships between visual-abstract concepts. The patient's Reasoning score is 102, with a percentile of 55, and falls in the average range.     Working Memory: Measures how well a subject can perceive and attend to symbols using short-term memory processes. Also measures the ability to carry out short-term memory tasks that support decision making, problem solving, planning, and execution. The patient's Working Memory score is 105, with a percentile of 63, and falls in the average range.    Sustained Attention: Measures how  well a subject can direct and focus cognitive activity on specific stimuli. Also measurs how well a subject can focus and complete task or activity, sequence action, and focus during complex thought. The patient's Sustained Attention score is 103, with a percentile of 58, and falls in the average range.    Simple Attention: Measures the ability to track and respond to a single defined stimulus over lengthy periods of time while performing vigilance and response inhibition quickly and accurately to a simple task. The patient's Simple Attention score is 96, with a percentile of 40, and falls within the average range.    Motor Speed: Measure: Ability to perform simple movements to produce and satisfy an intention towards a manual action and goal. The patient's Motor Speed score is 90, with a percentile of 25, and falls within the average range.    ON PRESCRIBED ADDERALL  Domain Standard Score Percentile Description Validity   Neurocognitive Index 100 50 Average Yes   Composite Memory Measure 115 84 Above Average Yes   Verbal Memory 115 84 Above Average Yes   Visual Memory 110 5 Above Average Yes   Psychomotor Speed 106 66 Average Yes   Reaction Time 76 8 Low Yes   Complex Attention 101 53 Average Yes   Cognitive Flexibility 97 42 Average Yes   Processing Speed 114 82 Above Average Yes   Executive Function 97 42 Average Yes   Reasoning 106 66 Average Yes   Working Memory 109 73 Average Yes   Sustained Attention  108 70 Average Yes   Simple Attention 85 16 Low Average Yes   Motor Speed 98 45 Average Yes     Neurocognitive Index (NCI): Measures an average score derived from the domain scores or a general assessment of the overall neurocognitive status of the patient. The patient's NCI score is 100, with a percentile of 50, and falls within the average range.    Composite Memory: Measures how well subject can recognize, remember, and retrieve words and geometric figures, and is comprised of the Visual and Verbal Memory  domains. The patient's Composite Memory score is 115, with a percentile of 84, and falls within the above average range.    Verbal Memory: Measures how well subject can recognize, remember, and retrieve words. The patient's Verbal Memory score is 115, with a percentile of 84, and falls within the above average range.    Visual Memory: Measures how well subject can recognize, remember and retrieve geometric figures. The patient's Visual Memory score is 110, with a percentile of 75, and falls within the above average range.    Psychomotor Speed: Measures how well a subject perceives, attends, responds to complex visual-perceptual information and performs simple fine motor coordination, and is comprised of the Motor Speed and Processing Speed indexes. The patient's Psychomotor Speed score is 106, with a percentile of 66, and falls within the average range.    Reaction Time: Measures how quickly the subject can react, in milliseconds, to a simple and increasingly complex direction set. The patient's Reaction Time score is 79, with a percentile of 8, and falls within the low range.    Complex Attention: Measures the ability to track and respond to a variety of stimuli over lengthy periods of time and/or perform complex mental tasks requiring vigilance quickly and accurately. The patient's Complex Attention score is 101, with a percentile of 53, and falls within the average range.    Cognitive Flexibility: Measures how well subject is able to adapt to rapidly changing and increasingly complex set of directions and/or to manipulate the information. The patient's Cognitive Flexibility score is 97, with a percentile of 42, and falls within the average range.    Processing Speed: Measures how well a subject recognizes and processes information i.e., perceiving, attending/responding to incoming information, motor speed, fine motor coordination, and visual-perceptual ability. The patient's Processing Speed score is 114, with a  percentile of 82, and falls within the above average range.    Executive Function: Measures how well a subject recognizes rules, categories, and manages or navigates rapid decision making. The patient's Executive Function score is 97, with a percentile of 42, and falls within the average range.    Reasoning: Measures how well a subject can perceive and understand the meaning of visual or abstract information and recognizing relationships between visual-abstract concepts. The patient's Reasoning score is 106, with a percentile of 66, and falls in the average range.     Working Memory: Measures how well a subject can perceive and attend to symbols using short-term memory processes. Also measures the ability to carry out short-term memory tasks that support decision making, problem solving, planning, and execution. The patient's Working Memory score is 109, with a percentile of 73, and falls in the average range.    Sustained Attention: Measures how well a subject can direct and focus cognitive activity on specific stimuli. Also measurs how well a subject can focus and complete task or activity, sequence action, and focus during complex thought. The patient's Sustained Attention score is 108, with a percentile of 70, and falls in the average range.    Simple Attention: Measures the ability to track and respond to a single defined stimulus over lengthy periods of time while performing vigilance and response inhibition quickly and accurately to a simple task. The patient's Simple Attention score is 85, with a percentile of 16, and falls within the low average range.    Motor Speed: Measure: Ability to perform simple movements to produce and satisfy an intention towards a manual action and goal. The patient's Motor Speed score is 98, with a percentile of 45, and falls within the average range.    Yumiko Adult ADHD Rating Scale-IV: Self and Other Reports (BAARS-IV)  The BAARS-IV assesses for symptoms of ADHD that are  "experienced in one's daily life. This assessment measure includes self and collateral rating scales designed to provide information regarding current and childhood symptoms of ADHD including inattention, hyperactivity, and impulsivity. Self-report scores are reported as percentiles. Scores at the 76th-83rd percentile are considered marginal, scores at the 84th-92nd percentile are considered borderline, scores at the 93rd-95th percentile are considered mild, scores at the 96th-98th percentile are considered moderate, and those at the 99th percentile are considered severe. Collateral or \"other\" rating scales are reported as number of symptoms observed in comparison to those reported by the client. Norms and percentile scores are not available for collateral reports.     Current Symptoms Scale--Self Report:   Client completed the self-report inventory of current symptoms. The results indicate that the client's Total ADHD Score was 52 which places the patient in the 98th percentile for overall ADHD symptoms. In addition, the client endorsed 8/9 (98th percentile) Inattention symptoms, 3/9 (93rd percentile) Hyperactivity-Impulsivity symptoms, and 7/9 (97th percentile) Sluggish Cognitive Tempo symptoms. Client indicated that the reported symptoms have resulted in impaired functioning in school, work, and social relationships. Overall, the results suggest the client is experiencing moderate ADHD symptoms.     Current Symptoms Scale--Other Report:  Client's cousin completed the collateral report inventory of current symptoms. Based on the collateral contact's observation of symptoms, the client demonstrates 7/9 Inattention symptoms, 4/5 Hyperactivity symptoms, 2/4 Impulsivity symptoms, and 6/9 Sluggish Cognitive Tempo symptoms. The client's Total ADHD Score was 55. The collateral contact indicated the client demonstrates impaired functioning in school and at work.  The collateral- and self-report scores are not significantly " "different.    Childhood Symptoms Scale--Self-Report:  Client completed the self-report inventory of childhood symptoms. The results indicate that the client's Total ADHD Score was 50 which places the patient in the 95th percentile for overall ADHD symptoms in childhood. In addition, the client endorsed 8/9 (98th percentile) Inattention symptoms and 4/9 (89th percentile) Hyperactivity-Impulsivity symptoms. Client indicated that the reported symptoms resulted in impaired functioning in school.  Overall, the results suggest the client experienced mild symptoms of ADHD as a child.     Childhood Symptoms Scale--Other Report:  Client's cousin completed the collateral report inventory of childhood symptoms. Based on the collateral contact's recollection of client's childhood symptoms, the client demonstrated 9/9 Inattention symptoms and 5/9 Hyperactivity-Impulsivity symptoms. The client's Total ADHD Score was 58. The collateral contact indicated the client demonstrates impaired functioning in school.  The collateral- and self-report scores are not significantly different.                           Yumiko Functional Impairment Scale: Self and Other Reports (BFIS)  The BFIS is used to assess an individuals' psychosocial impairment in major life/daily activities that may be due to a mental health disorder. This assessment measure includes self and collateral rating scales. Self-report scores are reported as percentiles. Scores at the 76th-83rd percentile are considered marginal, scores at the 84th-92nd percentile are considered borderline, scores at the 93rd-95th percentile are considered mild, scores at the 96th-98th percentile are considered moderate, and those at the 99th percentile are considered severe. Collateral or \"other\" rating scales are reported as number of symptoms observed in comparison to those reported by the client. Norms and percentile scores are not available for collateral reports.     Results indicate " "the client identified impairment (scores at or greater than 93rd percentile) in the following areas: work, education, money management, daily responsibilities, and health maintenance.  The client's Mean Impairment Score was 4.8 (86th percentile) indicating the client is reporting 42% impairment in functioning across domains. Client's cousin completed the collateral rating scale, which indicated similar results.     Yumiko Deficits in Executive Functioning Scale (BDEFS)  The BDEFS is a measure used for evaluating dimensions of adult executive functioning in daily life. This assessment measure includes self and collateral rating scales. Self-report scores are reported as percentiles. Scores at the 76th-83rd percentile are considered marginal, scores at the 84th-92nd percentile are considered borderline, scores at the 93rd-95th percentile are considered mild, scores at the 96th-98th percentile are considered moderate, and those at the 99th percentile are considered severe. Collateral or \"other\" rating scales are reported as number of symptoms observed in comparison to those reported by the client. Norms and percentile scores are not available for collateral reports.     Results indicate the client's Total Executive Functioning Score was 237 (97th percentile). The ADHD-Executive Functioning Index score was 31 (98th percentile). These scores suggest the client has moderate deficits in executive functioning. Results indicate the client identified significant deficits in the following areas: self-management to time (moderate deficits), self-organization/problem-solving (borderline deficits), self-restraint (borderline deficits), self-motivation (severe deficits) and self-regulation of emotions (mild deficits). Client's cousin completed the collateral rating scale, which indicated similar results.    Generalized Anxiety Disorder Questionnaire (RAI-7)  This questionnaire is designed to assess for anxiety in adults. Based on " the score, the patient is experiencing no symptoms of anxiety.     Patient Health Questionnaire- 9 (PHQ-9)   This questionnaire is designed to assess for depression in adults. Based on the score, the patient is experiencing mild symptoms of depression. Client identified the following symptoms of depression: difficulty with sleep, feeling tired or having little energy, poor appetite or overeating, feeling bad about self, and poor concentration.    SUMMARY: Collin Ramos is a 24-year-old [place] man who completed psychological testing remotely/virtually. Testing was requested to provide updated diagnostic clarification and necessary treatment recommendations.    Patient first completed a diagnostic interview in which mental health symptoms, ADHD symptoms, and background information was gathered. Patient self-reported eight symptoms of inattention, two symptoms of hyperactivity, and indicated that his abilities to function effectively at home and work are significantly impaired. Further, his self-reported symptoms on Yumiko measures of ADHD symptoms were consistent with this information.  Both the patient and his cousin recalled a history of significant symptoms in childhood.    An objective measure of neurocognitive functioning was administered while the patient was both taking and not taking his prescribed Adderall.  Off his prescribed Adderall, the patient's verbal memory was scored to be in the overall average range.  He was able to recognize target words immediately and after a delay comparable to peers.  Visual memory was scored to be in the low range.  He was able to recognize target shapes immediately and after a delay in the low range.  However, while taking his prescribed Adderall, both verbal and visual memory were then scored to be in the above average range, as he was able to recognize target words and shapes immediately and after a delay more effectively than peers.    Both on and off the prescription,  motor functioning appears to be intact, as motor speed and psychomotor speed were both scored to be in the average range.  Also both on and off the prescription, reasoning was scored to be in the average range, as the patient was able to solve nonverbal puzzle matrices comparable to peers.    Off Adderall, processing speed was scored to be in the average range, as the patient was able to scan and respond to visual stimuli comparable to peers.  On Adderall, the patient was able to work in the above average range, faster than peers.  Both on and off Adderall on the Stroop test, the patient was able to inhibit the salient, but incorrect, response in the average range.  However, off Adderall, the patient responded to stimuli significantly slower than peers.  On a test of shifting attention off Adderall, the patient was able to adjust his responses according to changing rules sets in the low average range while again responding to stimuli significantly slower than peers.  On Adderall, the patient was able to adjust his responses and respond in a timely manner, comparable to peers.  On a test of continuous performance off Adderall, the patient was able to arminda his attention and resist making impulsive mistakes in the average range.  However on Adderall, the patient was able to resist making impulsive mistakes less effectively than peers.  On a more complex continuous performance test that included one-back and two-back components, the patient was able to sustain his attention across time and hold information in mind in the average range both on and off Adderall.  However on Adderall, the patient responded to stimuli in a more timely manner.    On CNS Vital Signs, ADHD is associated with significant deficits in attention, processing speed, and executive functioning capabilities.  Off Adderall, these areas were negatively impacted by the patient's difficulties balancing speed and accuracy.  This data, combined with lack of  other significant mental health symptoms, indicates that current problems can be conceptualized as having a neurodevelopmental basis.  See recommendations below.    Referral Question Response: DSM-5 criteria for ADHD:   A. Symptom Count - Are there sufficient symptoms for the diagnosis? Yes, patient did endorse sufficient inattention symptoms.   B. Onset - Were several symptoms present before 12 years of age? Yes, a significant number of symptoms reportedly began in childhood.   C. Pervasiveness - Are several symptoms present in at least two settings? Yes, patient reported that symptoms are problematic at home and work.   D. Impairment - Do symptoms interfere with or reduce the quality of functioning? Yes, patient is unable to complete daily tasks effectively.   E. Exclusions - Are symptoms better explained by another disorder or factor?  No, symptoms are not better explained by another mental health disorder. Difficulties are explained by an organic basis of inattention.    DIAGNOSES:  F90.0 Attention-Deficit/Hyperactivity Disorder, inattentive presentation, moderate    PLAN OF CARE:  Discuss the following with your primary care provider:  Consider a trial of a stimulant medication. This may help alleviate some of the patient's attentional symptoms.     Consider initiating individual psychotherapy to help alleviate mood symptoms. Research indicates that outcomes are best with both medication and therapy. You can call the [facility] Behavioral Access line at [phone]. [facility] is recommended specifically because she works with individuals with ADHD.    The patient would benefit from academic accommodations. Being provided extended time to take exams in a distraction-free area and flexible deadlines for assignments are examples of such accommodations. The patient should meet with a counselor to discuss these and any other options offered.    RECOMMENDATIONS:  Due to the patient's reported  attention, concentration, and mood difficulties, the following health/lifestyle changes when combined, can significantly improve symptoms:   Avoid simple carbohydrates at breakfast. Aim for only complex carbohydrates and lean protein for your morning meal.   Engage in aerobic exercise 3 times per week for 30 minutes, ensuring that your heart rate stays within your training zone. Further, reading the book,  Spark,  by Geraldo Hall M.D. can help the patient understand the benefits of exercise on the brain.   Research suggest that taking a high-quality multi-vitamin and antioxidant (1/2 cup of blueberries) daily in conjunction with balanced nutrition can be helpful.  Aim for the high end of daily water intake: around 72 ounces per day.  Ensure regular meals and snacks to maintain optimal attention.    The following may be beneficial in managing some of the patient's attention and concentration difficulties:  Due to the patient's difficulties with attention and concentration, consider working in a completely distraction-free area while completing tasks. Workspaces should be completely clear except for the materials needed for the current task. Both visual and auditory distractions should be decreased as much as possible.  Considering decreased ability to focus and maintain attention, it is recommended that the patient take frequent breaks while completing tasks. This will help to maintain attention and effort. The patient may benefit from the use of a Gesplan Timer. The timer works by using built-in break times. After working on a task consistently for 25 minutes, the timer reminds the user to take a five-minute break before continuing, etc. A Gesplan timer can be downloaded as a free cindy to a phone or tablet.  Due to the patient's attentional and concentration symptoms, it is recommended to increase organization with the use of lists and calendars. Significantly increasing structure to the day and adhering to a set  schedule can increase your ability to complete responsibilities, track deadline, etc. Breaking these tasks down into their component parts and recording them in a calendar/planner will likely be beneficial. Patient would benefit from setting feasible timelines for completion of activities. By establishing clear priorities for completing tasks, you can more likely complete the most important tasks first. The patient may also choose to elect to a friend or family member to help hold them accountable.    Avoid multitasking. Attempting to work on multiple tasks and projects the same increases the likelihood that an error will occur. Focus on one task at a time.    Due to the patient's reported difficulties with attention, it is recommended to consistently take thorough notes in classes where it is warranted. The patient may consider using a smartpen to take these notes. A smartpen is able to capture audio and transfer written notes into a digital document. You can learn more about smartpens at [email or web address].    The patient may benefit from engaging in mindfulness practices. This may include breathing techniques, apps that provide guided meditation, or more interactive activities such as coloring.    See the attached tip sheet for more ideas as well as online and book resources.       Roselia Ribeiro PsyD,   Clinical Psychologist

## 2024-11-06 NOTE — Clinical Note
Hello,  I wanted to let you know that I have completed the ADHD assessment with this patient.  As you will see the report, data do support an ADHD diagnosis.  I encouraged him to make an appointment with you soon to discuss medication options.  Please let me know if you have any questions or concerns!  Thanks!   Roselia

## 2024-11-06 NOTE — PROGRESS NOTES
[facility]   Mental Health & Addiction Services     Progress Note - ADHD Feedback Session     Patient Name: Collin Ramos  Date: 2024       Service Type:  Individual       Session Start Time: 3:00pm  Session End Time:  3:28pm     Session Length: 28 minutes    Session #: 3    Attendees: Patient attended alone    Service Modality: Video Visit:      Provider verified identity through the following two step process.  Patient provided:  Patient  and Patient address    Telemedicine Visit: The patient's condition can be safely assessed and treated via synchronous audio and visual telemedicine encounter.      Reason for Telemedicine Visit: Services only offered telehealth    Originating Site (Patient Location): Patient's home    Distant Site (Provider Location): Provider Remote Setting- Home Office    Consent:  The patient/guardian has verbally consented to: the potential risks and benefits of telemedicine (video visit) versus in person care; bill my insurance or make self-payment for services provided; and responsibility for payment of non-covered services.     Patient would like the video invitation sent by:  Send to e-mail at: [email or web address]    Mode of Communication:  [facility]    Distant Location (Provider):  Off-site    As the provider I attest to compliance with applicable laws and regulations related to telemedicine.          2024     8:32 AM   PHQ   PHQ-9 Total Score 5   Q9: Thoughts of better off dead/self-harm past 2 weeks Not at all        Patient-reported           2024     8:32 AM   RAI-7 SCORE   Total Score 3         DATA      Progress Since Last Session (Related to Symptoms / Goals / Homework):   Symptoms: Stable.    Homework: Completed.      Treatment Objective(s) Addressed in This Session:   Provided feedback on ADHD evaluation. Reviewed test results in depth. Plan of care and recommendations  were discussed based on testing data. See full report attached on secondary note in this encounter.     Intervention:   Provided feedback to patient regarding testing results, diagnoses, and treatment recommendations. Test results are consistent with an ADHD diagnosis. Symptoms are not better explained by another mental health disorder. Personalized suggestions regarding symptoms were offered. Patient had the opportunity to ask questions; he expressed understanding.        ASSESSMENT: Current Emotional / Mental Status (status of significant symptoms):   Risk status (Self / Other harm or suicidal ideation)   Patient denies current fears or concerns for personal safety.   Patient denies current or recent suicidal ideation or behaviors.   Patient denies current or recent homicidal ideation or behaviors.   Patient denies current or recent self injurious behavior or ideation.   Patient denies other safety concerns.   Patient reports there has been no change in risk factors since their last session.     Patient reports there has been no change in protective factors since their last session.     Recommended that patient call 911 or go to the local ED should there be a change in any of these risk factors     Appearance:   Appropriate    Eye Contact:   Good    Psychomotor Behavior: Normal    Attitude:   Cooperative    Orientation:   All   Speech    Rate / Production: Normal     Volume:  Normal    Mood:    Normal   Affect:    Appropriate    Thought Content:  Clear    Thought Form:  Coherent  Logical    Insight:    Good      Medication Review:   No changes to current psychiatric medication(s)     Medication Compliance:   Yes     Changes in Health Issues:   None reported     Chemical Use Review:   Substance Use: Chemical use reviewed, no active concerns identified      Nicotine Use: No current tobacco use.      Diagnosis:  1. Attention deficit hyperactivity disorder, inattentive type, moderate        PLAN:   Recommendations are  outlined in full evaluation report (attached to this encounter).   Patient indicated understanding and will contact the clinic if there are further questions.    Report routed to referring provider.    Parts of this documentation may have been completed using dictation software. Potential errors may result and are unintentional.       Roselia Ribeiro PsyD, LP  Clinical Psychologist         Psychological Testing Services Summary       Testing Evaluation Services Base: 88582  (first 60 mins) Add-on: 00828  (each addtl 60 mins)   Record Review and Clarify Referral Question   :50am-9:00am on 6/26/2024 10 minutes   Clinical Decision Making/Battery Modification   7:45am-8:00am on 7/3/2024 15 minutes   Integration/Report Generation   12:00pm-1:00pm on 11/5/2024 ([place])  1:00pm-1:45pm on 11/5/2024 (CNS Vital Signs x2)  3:00pm-4:00pm on 11/5/2024 (Final Report)   60 minutes  30 minutes  60 minutes   Interactive Feedback Session   3:00pm-3:28pm on 11/6/2024 28 minutes   Post-Service Work   3:28pm-3:43pm on 11/6/2024 15 minutes   Total Time: 233 minutes   Total Units: 1 3       Diagnoses:   1. Attention deficit hyperactivity disorder, inattentive type, moderate

## 2024-11-21 ENCOUNTER — VIRTUAL VISIT (OUTPATIENT)
Dept: FAMILY MEDICINE | Facility: CLINIC | Age: 24
End: 2024-11-21
Payer: COMMERCIAL

## 2024-11-21 DIAGNOSIS — F90.9 ATTENTION DEFICIT HYPERACTIVITY DISORDER (ADHD), UNSPECIFIED ADHD TYPE: Primary | ICD-10-CM

## 2024-11-21 RX ORDER — DEXTROAMPHETAMINE SACCHARATE, AMPHETAMINE ASPARTATE, DEXTROAMPHETAMINE SULFATE AND AMPHETAMINE SULFATE 2.5; 2.5; 2.5; 2.5 MG/1; MG/1; MG/1; MG/1
10 TABLET ORAL 2 TIMES DAILY
Qty: 60 TABLET | Refills: 0 | Status: SHIPPED | OUTPATIENT
Start: 2024-12-21 | End: 2025-01-20

## 2024-11-21 RX ORDER — DEXTROAMPHETAMINE SACCHARATE, AMPHETAMINE ASPARTATE, DEXTROAMPHETAMINE SULFATE AND AMPHETAMINE SULFATE 2.5; 2.5; 2.5; 2.5 MG/1; MG/1; MG/1; MG/1
10 TABLET ORAL 2 TIMES DAILY
Qty: 60 TABLET | Refills: 0 | Status: SHIPPED | OUTPATIENT
Start: 2025-01-20 | End: 2025-02-19

## 2024-11-21 RX ORDER — DEXTROAMPHETAMINE SACCHARATE, AMPHETAMINE ASPARTATE, DEXTROAMPHETAMINE SULFATE AND AMPHETAMINE SULFATE 2.5; 2.5; 2.5; 2.5 MG/1; MG/1; MG/1; MG/1
10 TABLET ORAL 2 TIMES DAILY
Qty: 60 TABLET | Refills: 0 | Status: SHIPPED | OUTPATIENT
Start: 2024-11-21 | End: 2024-12-21

## 2024-11-21 NOTE — PROGRESS NOTES
Video visit  Assessment/ Plan  Attention deficit hyperactivity disorder (ADHD), unspecified ADHD type  ADHD, inattentive  Trouble sleeping with Adderall XR 20  Requesting IR  Otherwise effective,  Goals are improvement in academics, better focus and organization    No other side effect  Wishes to continue treatment.  Plan switch to 10 mg IR twice daily, 3-month supply prescribed, urine drug screen within the next month  Follow-up evaluation before 3-month renewal is needed   Subjective  CC:  chief complaint  HPI:  ADHD follow-up  Follow up on goals of medication: focus, better preformance on school work    parents overall assessment of how things are going: good, except that he is having trouble sleeping  Medication seems a little strong, but seems zora mostly the sleep that is the problem, requesting ir  medication: adderall x2 20  taking regularly? Yes, now run out  any side effects? As above  Comments: change to ir 10 bid  Agreement onf F/u interval: 3 mo    Las UDS: never, lab only needs to do   Answers submitted by the patient for this visit:  General Questionnaire (Submitted on 11/21/2024)  Chief Complaint: Chronic problems general questions HPI Form  What is the reason for your visit today? : ADHD eval followup  How many servings of fruits and vegetables do you eat daily?: 0-1  On average, how many sweetened beverages do you drink each day (Examples: soda, juice, sweet tea, etc.  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 60 tablet 0    [START ON 12/21/2024] amphetamine-dextroamphetamine (ADDERALL) 10 MG tablet Take 1 tablet (10 mg) by mouth 2 times daily. 60 tablet 0    [START ON 1/20/2025] amphetamine-dextroamphetamine (ADDERALL) 10 MG tablet Take 1 tablet (10 mg) by mouth 2 times daily. 60 tablet 0     No current facility-administered medications for this visit.        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  There is no height or weight on file to calculate BMI.  Patient is alert, oriented  Calm, focused    Diagnostics:   None, UDS pending      Please note: Voice recognition software was used in this dictation.  It may therefore contain typographical errors.  Physician onsite, patient was at work,  Video start time 10:10  End time 10:21  Total time spent today reviewing chart, documenting, prescribing medication, 25 minutes

## 2024-11-21 NOTE — ASSESSMENT & PLAN NOTE
ADHD, inattentive  Trouble sleeping with Adderall XR 20  Requesting IR  Otherwise effective,  Goals are improvement in academics, better focus and organization    No other side effect  Wishes to continue treatment.  Plan switch to 10 mg IR twice daily, 3-month supply prescribed, urine drug screen within the next month  Follow-up evaluation before 3-month renewal is needed

## 2024-11-21 NOTE — PROGRESS NOTES
"Collin is a 24 year old who is being evaluated via a billable video visit.    What phone number would you like to be contacted at? [phone]  How would you like to obtain your AVS? [employer]  {If patient encounters technical issues they should call [phone] :[id number]}    {PROVIDER CHARTING PREFERENCE:205642}    Subjective   Collin is a 24 year old, presenting for the following health issues:  No chief complaint on file.        11/21/2024     9:58 AM   Additional Questions   Roomed by Kathleen   Accompanied by self         11/21/2024     9:58 AM   Patient Reported Additional Medications   Patient reports taking the following new medications none     Video Start Time: {video visit start/end time for provider to select:425424}    History of Present Illness       Reason for visit:  ADHD eval followup    He eats 0-1 servings of fruits and vegetables daily.He consumes 1 sweetened beverage(s) daily.He exercises with enough effort to increase his heart rate 9 or less minutes per day.  He exercises with enough effort to increase his heart rate 3 or less days per week.   He is taking medications regularly.       Med Refill/Check  How many servings of fruits and vegetables do you eat daily?  2-3  On average, how many sweetened beverages do you drink each day (Examples: soda, juice, sweet tea, etc.  Do NOT count diet or artificially sweetened beverages)?   1  How many days per week do you exercise enough to make your heart beat faster? 3 or less  How many minutes a day do you exercise enough to make your heart beat faster? 60 or more  How many days per week do you miss taking your medication? 0  {additonal problems for provider to add (Optional):376036}    {ROS Picklists (Optional):555114}      Objective    Vitals - Patient Reported  Weight (Patient Reported): 104.3 kg (230 lb)  Height (Patient Reported): 177.8 cm (5' 10\")  BMI (Based on Pt Reported Ht/Wt): 33        Physical Exam   {video visit exam brief " "selected:273371}    {Diagnostic Test Results (Optional):185996}      Video-Visit Details    Type of service:  Video Visit   Video End Time:{video visit start/end time for provider to select:129791}  Originating Location (pt. Location): {video visit patient location:048322::\"Home\"}  {PROVIDER LOCATION On-site should be selected for visits conducted from your clinic location or adjoining [facility] hospital, academic office, or other nearby [facility] building. Off-site should be selected for all other provider locations, including home:323252}  Distant Location (provider location):  {virtual location provider:932680}  Platform used for Video Visit: {Virtual Visit Platforms:840168::\"Rijuven\"}  Signed Electronically by: Juventino Calvin MD  {Email feedback regarding this note to [email or web address]   :562959}  "

## 2025-01-10 ENCOUNTER — LAB (OUTPATIENT)
Dept: LAB | Facility: CLINIC | Age: 25
End: 2025-01-10
Payer: COMMERCIAL

## 2025-01-10 DIAGNOSIS — F90.9 ATTENTION DEFICIT HYPERACTIVITY DISORDER (ADHD), UNSPECIFIED ADHD TYPE: ICD-10-CM

## 2025-01-10 LAB
AMPHETAMINES UR QL SCN: NORMAL
BARBITURATES UR QL SCN: NORMAL
BENZODIAZ UR QL SCN: NORMAL
BZE UR QL SCN: NORMAL
CANNABINOIDS UR QL SCN: NORMAL
FENTANYL UR QL: NORMAL
OPIATES UR QL SCN: NORMAL
PCP QUAL URINE (ROCHE): NORMAL

## 2025-01-10 PROCEDURE — 80307 DRUG TEST PRSMV CHEM ANLYZR: CPT

## 2025-01-11 ENCOUNTER — HEALTH MAINTENANCE LETTER (OUTPATIENT)
Age: 25
End: 2025-01-11

## 2025-05-14 ENCOUNTER — TELEPHONE (OUTPATIENT)
Dept: FAMILY MEDICINE | Facility: CLINIC | Age: 25
End: 2025-05-14
Payer: COMMERCIAL

## 2025-05-14 NOTE — TELEPHONE ENCOUNTER
General Call    Contacts       Contact Date/Time Type Contact Phone/Fax    05/14/2025 04:34 PM CDT Phone (Incoming) Collin Ramos (Self) [phone] (M)          Reason for Call:  Patient called for status of Adderall  medication.  Per chart reviewed, it appeared medication had been cancelled.  Last appt was 11/2024.  RN advised this maybe due to no follow up appt as instructed from provider's visit to follow up in 3 months from 11/2024 and it has been followed through.  Patient verbalized understood.     Date of last appointment with provider: 11/21/2024 virtual  A in-person visit scheduled for 5/19/25.  Date, time and location confirmed.    Enrico Frost RN  [facility]

## 2025-05-19 ENCOUNTER — OFFICE VISIT (OUTPATIENT)
Dept: FAMILY MEDICINE | Facility: CLINIC | Age: 25
End: 2025-05-19
Payer: COMMERCIAL

## 2025-05-19 VITALS
HEART RATE: 83 BPM | BODY MASS INDEX: 32.48 KG/M2 | RESPIRATION RATE: 18 BRPM | SYSTOLIC BLOOD PRESSURE: 116 MMHG | WEIGHT: 232 LBS | OXYGEN SATURATION: 98 % | HEIGHT: 71 IN | DIASTOLIC BLOOD PRESSURE: 74 MMHG | TEMPERATURE: 97.9 F

## 2025-05-19 DIAGNOSIS — F90.9 ATTENTION DEFICIT HYPERACTIVITY DISORDER (ADHD), UNSPECIFIED ADHD TYPE: Primary | ICD-10-CM

## 2025-05-19 DIAGNOSIS — Z00.00 HEALTHCARE MAINTENANCE: ICD-10-CM

## 2025-05-19 DIAGNOSIS — L70.0 ACNE VULGARIS: ICD-10-CM

## 2025-05-19 PROCEDURE — 3078F DIAST BP <80 MM HG: CPT | Performed by: FAMILY MEDICINE

## 2025-05-19 PROCEDURE — G2211 COMPLEX E/M VISIT ADD ON: HCPCS | Performed by: FAMILY MEDICINE

## 2025-05-19 PROCEDURE — 3074F SYST BP LT 130 MM HG: CPT | Performed by: FAMILY MEDICINE

## 2025-05-19 PROCEDURE — 99213 OFFICE O/P EST LOW 20 MIN: CPT | Performed by: FAMILY MEDICINE

## 2025-05-19 RX ORDER — DEXTROAMPHETAMINE SACCHARATE, AMPHETAMINE ASPARTATE, DEXTROAMPHETAMINE SULFATE AND AMPHETAMINE SULFATE 2.5; 2.5; 2.5; 2.5 MG/1; MG/1; MG/1; MG/1
10 TABLET ORAL 2 TIMES DAILY
Qty: 60 TABLET | Refills: 0 | Status: SHIPPED | OUTPATIENT
Start: 2025-06-18 | End: 2025-07-18

## 2025-05-19 RX ORDER — DEXTROAMPHETAMINE SACCHARATE, AMPHETAMINE ASPARTATE, DEXTROAMPHETAMINE SULFATE AND AMPHETAMINE SULFATE 2.5; 2.5; 2.5; 2.5 MG/1; MG/1; MG/1; MG/1
10 TABLET ORAL 2 TIMES DAILY
Qty: 60 TABLET | Refills: 0 | Status: SHIPPED | OUTPATIENT
Start: 2025-07-18 | End: 2025-08-17

## 2025-05-19 RX ORDER — DEXTROAMPHETAMINE SACCHARATE, AMPHETAMINE ASPARTATE, DEXTROAMPHETAMINE SULFATE AND AMPHETAMINE SULFATE 2.5; 2.5; 2.5; 2.5 MG/1; MG/1; MG/1; MG/1
10 TABLET ORAL 2 TIMES DAILY
Qty: 60 TABLET | Refills: 0 | Status: SHIPPED | OUTPATIENT
Start: 2025-05-19 | End: 2025-06-18

## 2025-05-19 NOTE — ASSESSMENT & PLAN NOTE
Medication works well.  Uses it for the 5 days of the workweek and takes a break over the weekend.  Feels like he does not want to keep using it over the weekend    Previously having problems with insomnia medicine but was drinking caffeine at the same time.  Finds now that he does not have this problem without the caffeine and has changed the timing of the medication.    May be interested in doing the extended release Adderall going forward.  UDS done in the fall  CSA done today  See back yearly    Offered to change to extended release 20 mg if he requests.    Discussed contradictory evidence regarding long-term stimulant risk and vascular disease.  Discussed possibility that they do raise vascular risk in the long-term.  Right now it seems that benefits justify the slight risk.  Not having any palpitations currently.

## 2025-05-19 NOTE — ASSESSMENT & PLAN NOTE
More severe in adolescence, now has gotten somewhat better.  Previously saw dermatology and may ask for referral going forward.  Agreed that I could do this without a visit.  Exam pretty unremarkable today

## 2025-05-19 NOTE — LETTER
[facility]  05/19/25  Patient: Collin Ramos  YOB: 2000  Medical Record Number: [id number]                                                                                  Non-Opioid Controlled Substance Agreement    This is an agreement between you and your provider regarding safe and appropriate use of controlled substances prescribed by your care team. Controlled substances are?medicines that can cause physical and mental dependence (abuse).     There are strict laws about having and using these medicines. We here at [facility] are  committed to working with you in your efforts to get better. To support you in this work, we'll help you schedule regular office appointments for medicine refills. If we must cancel or change your appointment for any reason, we'll make sure you have enough medicine to last until your next appointment.     As a Provider, I will:   Listen carefully to your concerns while treating you with respect.   Recommend a treatment plan that I believe is in your best interest and may involve therapies other than medicine.    Talk with you often about the possible benefits and the risk of harm of any medicine that we prescribe for you.  Assess the safety of this medicine and check how well it works.    Provide a plan on how to taper (discontinue or go off) using this medicine if the decision is made to stop its use.      ::  As a Patient, I understand controlled substances:     Are prescribed by my care provider to help me function or work and manage my condition(s).?  Are strong medicines and can cause serious side effects.     Need to be taken exactly as prescribed.?Combining controlled substances with certain medicines or chemicals (such as illegal drugs, alcohol, sedatives, sleeping pills, and benzodiazepines) can be dangerous or even fatal.? If I stop taking my medicines suddenly, I may have severe withdrawal symptoms.     The risks, benefits,  and side effects of these medicine(s) were explained to me. I agree that:    I will take part in other treatments as advised by my care team. This may be psychiatry or counseling, physical therapy, behavioral therapy, group treatment or a referral to specialist.    I will keep all my appointments and understand this is part of the monitoring of controlled substances.?My care team may require an office visit for EVERY controlled substance refill. If I miss appointments or don t follow instructions, my care team may stop my medicine    I will take my medicines as prescribed. I will not change the dose or schedule unless my care team tells me to. There will be no refills if I run out early.      I may be asked to come to the clinic and complete a urine drug test or complete a pill count. If I don t give a urine sample or participate in a pill count, the care team may stop my medicine.    I will only receive controlled substance prescriptions from this clinic. If I am treated by another provider, I will tell them that I am taking controlled substances and that I have a treatment agreement with this provider. I will inform my [facility] care team within one business day if I am given a prescription for any controlled substance by another healthcare provider. My [facility] care team can contact other providers and pharmacists about my use of any medicines.    It is up to me to make sure that I don't run out of my medicines on weekends or holidays.?If my care team is willing to refill my prescription without a visit, I must request refills only during office hours. Refills may take up to 3 business days to process. I will use one pharmacy to fill all my controlled substance prescriptions. I will notify the clinic about any changes to my insurance or medicine availability.    I am responsible for my prescriptions. If the medicine/prescription is lost, stolen or destroyed, it will not be replaced.?I also agree  not to share controlled substance medicines with anyone.     I am aware I should not use any illegal or recreational drugs. I agree not to drink alcohol unless my care team says I can.     If I enroll in the [place] [facility] Cannabis program, I will tell my care team before my next refill.    I will tell my care team right away if I become pregnant, have a new medical problem treated outside of my regular clinic, or have a change in my medicines.     I understand that this medicine can affect my thinking, judgment and reaction time.? Alcohol and drugs affect the brain and body, which can affect the safety of my driving. Being under the influence of alcohol or drugs can affect my decision-making, behaviors, personal safety and the safety of others. Driving while impaired (DWI) can occur if a person is driving, operating or in physical control of a car, motorcycle, boat, snowmobile, ATV, motorbike, off-road vehicle or any other motor vehicle ([place] Statute 169A.20). I understand the risk if I choose to drive or operate any vehicle or machinery.    I understand that if I do not follow any of the conditions above, my prescriptions or treatment may be stopped or changed.   I agree that my provider, clinic care team and pharmacy may work with any city, state or federal law enforcement agency that investigates the misuse, sale or other diversion of my controlled medicine. I will allow my provider to discuss my care with, or share a copy of, this agreement with any other treating provider, pharmacy or emergency room where I receive care.     I have read this agreement and have asked questions about anything I did not understand.    ________________________________________________________  Patient Signature - Collin Ramos     ___________________                   Date     ________________________________________________________  Provider Signature - Juventino Calvin MD       ___________________                   Date      ________________________________________________________  Witness Signature (required if provider not present while patient signing)          ___________________                   Date

## 2025-05-19 NOTE — PROGRESS NOTES
"  {PROVIDER CHARTING PREFERENCE:855393}    Chris Polanco is a 25 year old, presenting for the following health issues:  Medication Refill (Adderall refill )        5/19/2025     3:16 PM   Additional Questions   Roomed by hser   Accompanied by self     Medication Refill    History of Present Illness       Reason for visit:  Followup related to ADHD    He eats 0-1 servings of fruits and vegetables daily.He consumes 1 sweetened beverage(s) daily.He exercises with enough effort to increase his heart rate 9 or less minutes per day.  He exercises with enough effort to increase his heart rate 3 or less days per week. He is missing 3 dose(s) of medications per week.  He is not taking prescribed medications regularly due to other.        {MA/LPN/RN Pre-Provider Visit Orders- hCG/UA/Strep (Optional):845084}  {SUPERLIST (Optional):223682}  {additonal problems for provider to add (Optional):812045}    {ROS Picklists (Optional):062719}      Objective    /74 (BP Location: Right arm, Patient Position: Sitting, Cuff Size: Adult Large)   Pulse 83   Temp 97.9  F (36.6  C) (Temporal)   Resp 18   Ht 1.791 m (5' 10.5\")   Wt 105.2 kg (232 lb)   SpO2 98%   BMI 32.82 kg/m    Body mass index is 32.82 kg/m .  Physical Exam   {Exam List (Optional):558123}    {Diagnostic Test Results (Optional):013277}        Signed Electronically by: Juventino Calvin MD  {Email feedback regarding this note to [email or web address]   :843494}  "

## 2025-05-19 NOTE — PROGRESS NOTES
Prior to immunization administration, verified patients identity using patient s name and date of birth. Please see Immunization Activity for additional information.     Screening Questionnaire for Adult Immunization    Are you sick today?   No   Do you have allergies to medications, food, a vaccine component or latex?   No   Have you ever had a serious reaction after receiving a vaccination?   No   Do you have a long-term health problem with heart, lung, kidney, or metabolic disease (e.g., diabetes), asthma, a blood disorder, no spleen, complement component deficiency, a cochlear implant, or a spinal fluid leak?  Are you on long-term aspirin therapy?   No   Do you have cancer, leukemia, HIV/AIDS, or any other immune system problem?   No   Do you have a parent, brother, or sister with an immune system problem?   No   In the past 3 months, have you taken medications that affect  your immune system, such as prednisone, other steroids, or anticancer drugs; drugs for the treatment of rheumatoid arthritis, Crohn s disease, or psoriasis; or have you had radiation treatments?   No   Have you had a seizure, or a brain or other nervous system problem?   No   During the past year, have you received a transfusion of blood or blood    products, or been given immune (gamma) globulin or antiviral drug?   No   For women: Are you pregnant or is there a chance you could become       pregnant during the next month?   No   Have you received any vaccinations in the past 4 weeks?   No     Immunization questionnaire answers were all negative.      Patient instructed to remain in clinic for 15 minutes afterwards, and to report any adverse reactions.     Screening performed by Franko Berger MA on 5/19/2025 at 3:17 PM.       Answers submitted by the patient for this visit:  General Questionnaire (Submitted on 5/19/2025)  Chief Complaint: Chronic problems general questions HPI Form  What is the reason for your visit today? : Followup  related to ADHD  How many servings of fruits and vegetables do you eat daily?: 0-1  On average, how many sweetened beverages do you drink each day (Examples: soda, juice, sweet tea, etc.  Do NOT count diet or artificially sweetened beverages)?: 1  How many minutes a day do you exercise enough to make your heart beat faster?: 9 or less  How many days a week do you exercise enough to make your heart beat faster?: 3 or less  How many days per week do you miss taking your medication?: 3  What makes it hard for you to take your medication every day?: other  Questionnaire about: Chronic problems general questions HPI Form (Submitted on 5/19/2025)  Chief Complaint: Chronic problems general questions HPI Form

## 2025-05-19 NOTE — PROGRESS NOTES
Assessment/ Plan  Attention deficit hyperactivity disorder (ADHD), unspecified ADHD type  Medication works well.  Uses it for the 5 days of the workweek and takes a break over the weekend.  Feels like he does not want to keep using it over the weekend    Previously having problems with insomnia medicine but was drinking caffeine at the same time.  Finds now that he does not have this problem without the caffeine and has changed the timing of the medication.    May be interested in doing the extended release Adderall going forward.  UDS done in the fall  CSA done today  See back yearly    Offered to change to extended release 20 mg if he requests.    Discussed contradictory evidence regarding long-term stimulant risk and vascular disease.  Discussed possibility that they do raise vascular risk in the long-term.  Right now it seems that benefits justify the slight risk.  Not having any palpitations currently.      Healthcare maintenance  Declines COVID shot, no need for STD screening    Acne vulgaris  More severe in adolescence, now has gotten somewhat better.  Previously saw dermatology and may ask for referral going forward.  Agreed that I could do this without a visit.  Exam pretty unremarkable today   Subjective  CC:  chief complaint  HPI:  Patient here for follow-up on ADHD.  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 60  "tablet 0    [START ON 6/18/2025] amphetamine-dextroamphetamine (ADDERALL) 10 MG tablet Take 1 tablet (10 mg) by mouth 2 times daily. 60 tablet 0    [START ON 7/18/2025] amphetamine-dextroamphetamine (ADDERALL) 10 MG tablet Take 1 tablet (10 mg) by mouth 2 times daily. 60 tablet 0     No current facility-administered medications for this visit.        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    Home care    Single , straight         Patient Care Team:  Juventino Calvin MD as PCP - General (Family Medicine)  Juventino Calvin MD as Assigned PCP  Roselia Ribeiro PsyD as Assigned Behavioral Health Provider  Roselia Ribeiro PsyD as Psychologist (PSYCHOLOGIST CLINICAL)        Objective  Physical Exam  Vitals:    05/19/25 1517   BP: 116/74   BP Location: Right arm   Patient Position: Sitting   Cuff Size: Adult Large   Pulse: 83   Resp: 18   Temp: 97.9  F (36.6  C)   TempSrc: Temporal   SpO2: 98%   Weight: 105.2 kg (232 lb)   Height: 1.791 m (5' 10.5\")     Body mass index is 32.82 kg/m .  My initial visit with him was 11/21/2023, diagnosed with ADHD 4/24/2024.  With struggling in school, failing, disorganized, family history of ADHD in sister, referred for evaluation with psychology, started Adderall XR.  Examiner agreed with ADD, inattentive presentation  Patient has tolerated the stimulant for the most part well.  Did initially have some problems with sleep and sometimes felt overstimulated.  Stopping caffeine has helped both of these.  Initially started on 20 mg extended release, switch to 10 mg short release.  Wondering if he should go back to the 20 mg extended release in the future for more sustained benefit.  This now that he stopped caffeine.    Also today, discussed previous history of acne, has oily skin and some small papules on his forehead.  Much worse acne in the past.  Did see a dermatologist for a " time.  Indicates that he cannot afford it now since he has some insurance issues but may ask for that going forward.  Offered to do this without a visit  Diagnostics:   None      Please note: Voice recognition software was used in this dictation.  It may therefore contain typographical errors.      Answers submitted by the patient for this visit:  General Questionnaire (Submitted on 5/19/2025)  Chief Complaint: Chronic problems general questions HPI Form  What is the reason for your visit today? : Followup related to ADHD  How many servings of fruits and vegetables do you eat daily?: 0-1  On average, how many sweetened beverages do you drink each day (Examples: soda, juice, sweet tea, etc.  Do NOT count diet or artificially sweetened beverages)?: 1  How many minutes a day do you exercise enough to make your heart beat faster?: 9 or less  How many days a week do you exercise enough to make your heart beat faster?: 3 or less  How many days per week do you miss taking your medication?: 3  What makes it hard for you to take your medication every day?: other  Questionnaire about: Chronic problems general questions HPI Form (Submitted on 5/19/2025)  Chief Complaint: Chronic problems general questions HPI Form

## 2025-05-20 ENCOUNTER — PATIENT OUTREACH (OUTPATIENT)
Dept: CARE COORDINATION | Facility: CLINIC | Age: 25
End: 2025-05-20
Payer: COMMERCIAL